# Patient Record
Sex: MALE | Race: BLACK OR AFRICAN AMERICAN | NOT HISPANIC OR LATINO | Employment: UNEMPLOYED | URBAN - METROPOLITAN AREA
[De-identification: names, ages, dates, MRNs, and addresses within clinical notes are randomized per-mention and may not be internally consistent; named-entity substitution may affect disease eponyms.]

---

## 2021-11-09 ENCOUNTER — TELEPHONE (OUTPATIENT)
Dept: BEHAVIORAL HEALTH | Facility: CLINIC | Age: 41
End: 2021-11-09

## 2021-11-09 ENCOUNTER — HOSPITAL ENCOUNTER (INPATIENT)
Facility: CLINIC | Age: 41
LOS: 3 days | Discharge: HOME | DRG: 897 | End: 2021-11-12
Attending: EMERGENCY MEDICINE | Admitting: PSYCHIATRY & NEUROLOGY

## 2021-11-09 DIAGNOSIS — Z20.822 LAB TEST NEGATIVE FOR COVID-19 VIRUS: ICD-10-CM

## 2021-11-09 DIAGNOSIS — R45.851 SUICIDAL IDEATION: ICD-10-CM

## 2021-11-09 DIAGNOSIS — I10 HYPERTENSION, UNSPECIFIED TYPE: Primary | ICD-10-CM

## 2021-11-09 DIAGNOSIS — T14.8XXA OPEN WOUND: ICD-10-CM

## 2021-11-09 DIAGNOSIS — F43.23 ADJUSTMENT DISORDER WITH MIXED ANXIETY AND DEPRESSED MOOD: ICD-10-CM

## 2021-11-09 LAB
ALBUMIN SERPL-MCNC: 2.8 G/DL (ref 3.4–5)
ALCOHOL BREATH TEST: 0 (ref 0–0.01)
ALP SERPL-CCNC: 85 U/L (ref 40–150)
ALT SERPL W P-5'-P-CCNC: 23 U/L (ref 0–70)
AMPHETAMINES UR QL SCN: ABNORMAL
ANION GAP SERPL CALCULATED.3IONS-SCNC: 6 MMOL/L (ref 3–14)
ANION GAP SERPL CALCULATED.3IONS-SCNC: 6 MMOL/L (ref 3–14)
APAP SERPL-MCNC: <2 MG/L (ref 10–30)
AST SERPL W P-5'-P-CCNC: 28 U/L (ref 0–45)
ATRIAL RATE - MUSE: 98 BPM
BARBITURATES UR QL: ABNORMAL
BASOPHILS # BLD AUTO: 0.1 10E3/UL (ref 0–0.2)
BASOPHILS NFR BLD AUTO: 1 %
BENZODIAZ UR QL: ABNORMAL
BILIRUB SERPL-MCNC: 0.4 MG/DL (ref 0.2–1.3)
BUN SERPL-MCNC: 35 MG/DL (ref 7–30)
BUN SERPL-MCNC: 36 MG/DL (ref 7–30)
CALCIUM SERPL-MCNC: 8.3 MG/DL (ref 8.5–10.1)
CALCIUM SERPL-MCNC: 8.4 MG/DL (ref 8.5–10.1)
CANNABINOIDS UR QL SCN: ABNORMAL
CHLORIDE BLD-SCNC: 110 MMOL/L (ref 94–109)
CHLORIDE BLD-SCNC: 110 MMOL/L (ref 94–109)
CO2 SERPL-SCNC: 22 MMOL/L (ref 20–32)
CO2 SERPL-SCNC: 22 MMOL/L (ref 20–32)
COCAINE UR QL: ABNORMAL
CREAT SERPL-MCNC: 2.04 MG/DL (ref 0.66–1.25)
CREAT SERPL-MCNC: 2.14 MG/DL (ref 0.66–1.25)
DIASTOLIC BLOOD PRESSURE - MUSE: NORMAL MMHG
EOSINOPHIL # BLD AUTO: 0 10E3/UL (ref 0–0.7)
EOSINOPHIL NFR BLD AUTO: 0 %
ERYTHROCYTE [DISTWIDTH] IN BLOOD BY AUTOMATED COUNT: 14.6 % (ref 10–15)
GFR SERPL CREATININE-BSD FRML MDRD: 38 ML/MIN/1.73M2
GFR SERPL CREATININE-BSD FRML MDRD: 40 ML/MIN/1.73M2
GLUCOSE BLD-MCNC: 72 MG/DL (ref 70–99)
GLUCOSE BLD-MCNC: 73 MG/DL (ref 70–99)
HCT VFR BLD AUTO: 35.2 % (ref 40–53)
HGB BLD-MCNC: 11.5 G/DL (ref 13.3–17.7)
IMM GRANULOCYTES # BLD: 0 10E3/UL
IMM GRANULOCYTES NFR BLD: 0 %
INTERPRETATION ECG - MUSE: NORMAL
LYMPHOCYTES # BLD AUTO: 1.4 10E3/UL (ref 0.8–5.3)
LYMPHOCYTES NFR BLD AUTO: 16 %
MCH RBC QN AUTO: 26.5 PG (ref 26.5–33)
MCHC RBC AUTO-ENTMCNC: 32.7 G/DL (ref 31.5–36.5)
MCV RBC AUTO: 81 FL (ref 78–100)
MONOCYTES # BLD AUTO: 0.4 10E3/UL (ref 0–1.3)
MONOCYTES NFR BLD AUTO: 4 %
NEUTROPHILS # BLD AUTO: 7 10E3/UL (ref 1.6–8.3)
NEUTROPHILS NFR BLD AUTO: 79 %
NRBC # BLD AUTO: 0 10E3/UL
NRBC BLD AUTO-RTO: 0 /100
OPIATES UR QL SCN: ABNORMAL
P AXIS - MUSE: 72 DEGREES
PLATELET # BLD AUTO: 398 10E3/UL (ref 150–450)
POTASSIUM BLD-SCNC: 4.6 MMOL/L (ref 3.4–5.3)
POTASSIUM BLD-SCNC: 5 MMOL/L (ref 3.4–5.3)
PR INTERVAL - MUSE: 154 MS
PROT SERPL-MCNC: 7.7 G/DL (ref 6.8–8.8)
QRS DURATION - MUSE: 76 MS
QT - MUSE: 398 MS
QTC - MUSE: 508 MS
R AXIS - MUSE: -19 DEGREES
RBC # BLD AUTO: 4.34 10E6/UL (ref 4.4–5.9)
SALICYLATES SERPL-MCNC: <2 MG/DL
SARS-COV-2 RNA RESP QL NAA+PROBE: NEGATIVE
SODIUM SERPL-SCNC: 138 MMOL/L (ref 133–144)
SODIUM SERPL-SCNC: 138 MMOL/L (ref 133–144)
SYSTOLIC BLOOD PRESSURE - MUSE: NORMAL MMHG
T AXIS - MUSE: 87 DEGREES
TROPONIN I SERPL-MCNC: 0.04 UG/L (ref 0–0.04)
TROPONIN I SERPL-MCNC: 0.04 UG/L (ref 0–0.04)
VENTRICULAR RATE- MUSE: 98 BPM
WBC # BLD AUTO: 8.8 10E3/UL (ref 4–11)

## 2021-11-09 PROCEDURE — 86780 TREPONEMA PALLIDUM: CPT | Performed by: EMERGENCY MEDICINE

## 2021-11-09 PROCEDURE — 90791 PSYCH DIAGNOSTIC EVALUATION: CPT

## 2021-11-09 PROCEDURE — 93010 ELECTROCARDIOGRAM REPORT: CPT | Performed by: EMERGENCY MEDICINE

## 2021-11-09 PROCEDURE — 85025 COMPLETE CBC W/AUTO DIFF WBC: CPT | Performed by: EMERGENCY MEDICINE

## 2021-11-09 PROCEDURE — 99285 EMERGENCY DEPT VISIT HI MDM: CPT | Mod: 25 | Performed by: EMERGENCY MEDICINE

## 2021-11-09 PROCEDURE — 84484 ASSAY OF TROPONIN QUANT: CPT | Performed by: EMERGENCY MEDICINE

## 2021-11-09 PROCEDURE — 93005 ELECTROCARDIOGRAM TRACING: CPT | Performed by: EMERGENCY MEDICINE

## 2021-11-09 PROCEDURE — 124N000002 HC R&B MH UMMC

## 2021-11-09 PROCEDURE — 87389 HIV-1 AG W/HIV-1&-2 AB AG IA: CPT | Performed by: EMERGENCY MEDICINE

## 2021-11-09 PROCEDURE — C9803 HOPD COVID-19 SPEC COLLECT: HCPCS | Performed by: EMERGENCY MEDICINE

## 2021-11-09 PROCEDURE — 82075 ASSAY OF BREATH ETHANOL: CPT | Performed by: EMERGENCY MEDICINE

## 2021-11-09 PROCEDURE — U0005 INFEC AGEN DETEC AMPLI PROBE: HCPCS | Performed by: EMERGENCY MEDICINE

## 2021-11-09 PROCEDURE — 80048 BASIC METABOLIC PNL TOTAL CA: CPT | Performed by: EMERGENCY MEDICINE

## 2021-11-09 PROCEDURE — 80143 DRUG ASSAY ACETAMINOPHEN: CPT | Performed by: EMERGENCY MEDICINE

## 2021-11-09 PROCEDURE — 80179 DRUG ASSAY SALICYLATE: CPT | Performed by: EMERGENCY MEDICINE

## 2021-11-09 PROCEDURE — 80307 DRUG TEST PRSMV CHEM ANLYZR: CPT | Performed by: EMERGENCY MEDICINE

## 2021-11-09 PROCEDURE — 250N000013 HC RX MED GY IP 250 OP 250 PS 637: Performed by: PSYCHIATRY & NEUROLOGY

## 2021-11-09 PROCEDURE — 36415 COLL VENOUS BLD VENIPUNCTURE: CPT | Performed by: EMERGENCY MEDICINE

## 2021-11-09 RX ORDER — AMOXICILLIN 250 MG
1 CAPSULE ORAL 2 TIMES DAILY PRN
Status: DISCONTINUED | OUTPATIENT
Start: 2021-11-09 | End: 2021-11-12 | Stop reason: HOSPADM

## 2021-11-09 RX ORDER — ACETAMINOPHEN 325 MG/1
650 TABLET ORAL EVERY 4 HOURS PRN
Status: DISCONTINUED | OUTPATIENT
Start: 2021-11-09 | End: 2021-11-12 | Stop reason: HOSPADM

## 2021-11-09 RX ORDER — OLANZAPINE 10 MG/2ML
10 INJECTION, POWDER, FOR SOLUTION INTRAMUSCULAR 3 TIMES DAILY PRN
Status: DISCONTINUED | OUTPATIENT
Start: 2021-11-09 | End: 2021-11-10

## 2021-11-09 RX ORDER — OLANZAPINE 10 MG/1
10 TABLET ORAL 3 TIMES DAILY PRN
Status: DISCONTINUED | OUTPATIENT
Start: 2021-11-09 | End: 2021-11-10

## 2021-11-09 RX ORDER — TRAZODONE HYDROCHLORIDE 50 MG/1
50 TABLET, FILM COATED ORAL
Status: DISCONTINUED | OUTPATIENT
Start: 2021-11-09 | End: 2021-11-12 | Stop reason: HOSPADM

## 2021-11-09 RX ORDER — HYDROXYZINE HYDROCHLORIDE 25 MG/1
25 TABLET, FILM COATED ORAL EVERY 4 HOURS PRN
Status: DISCONTINUED | OUTPATIENT
Start: 2021-11-09 | End: 2021-11-12 | Stop reason: HOSPADM

## 2021-11-09 RX ORDER — MAGNESIUM HYDROXIDE/ALUMINUM HYDROXICE/SIMETHICONE 120; 1200; 1200 MG/30ML; MG/30ML; MG/30ML
30 SUSPENSION ORAL EVERY 4 HOURS PRN
Status: DISCONTINUED | OUTPATIENT
Start: 2021-11-09 | End: 2021-11-12 | Stop reason: HOSPADM

## 2021-11-09 RX ADMIN — ACETAMINOPHEN 650 MG: 325 TABLET, FILM COATED ORAL at 19:49

## 2021-11-09 ASSESSMENT — ACTIVITIES OF DAILY LIVING (ADL)
HYGIENE/GROOMING: INDEPENDENT
DIFFICULTY_COMMUNICATING: NO
TOILETING_ISSUES: NO
DIFFICULTY_EATING/SWALLOWING: NO
DRESS: SCRUBS (BEHAVIORAL HEALTH);INDEPENDENT
DRESSING/BATHING_DIFFICULTY: NO
ORAL_HYGIENE: INDEPENDENT

## 2021-11-09 ASSESSMENT — MIFFLIN-ST. JEOR
SCORE: 1614.8
SCORE: 1615.25

## 2021-11-09 ASSESSMENT — ENCOUNTER SYMPTOMS
COUGH: 0
DYSPHORIC MOOD: 1
HEADACHES: 1
SHORTNESS OF BREATH: 1

## 2021-11-09 NOTE — ED NOTES
Appears to with admission. Requested and given scrub pants and top. Clothes and shoes- 1 bag total given to transport

## 2021-11-09 NOTE — ED PROVIDER NOTES
"    [facility] EMERGENCY DEPARTMENT ([facility])    11/09/21     ED 9 12:02 PM  History     Chief Complaint   Patient presents with     Ingestion     Pt smoked THC laced with unknown substance     The history is provided by the patient.     Olvin Jaimes is a 41 year old male who presents with suicidal ideation and concern that his marijuana was laced.  He is new to the [facility] system.  He reports using 2 g of cocaine this morning in an attempt to overdose and kill himself.  He did get some chest pain or shortness of breath with this, states that he \"Just wanted to go out peacefully.\"  He also smoked some marijuana that he thinks was  laced with some unknown substance as it was not affecting him the way that marijuana usually does.  He thinks this may have been K2.  He states that states his eyesight went in and out, and he was nauseated.  He states this was not an attempt to kill himself but to just feel better.  He currently c/o nausea and persistent SI. He wants to overdose or take pills but does not think he will harm himself while in the hospital here. He has been picking off scabs repeatedly on his knees.  He states that he had been picking at his scabs just to feel something.  He states that he has been vaccinated for COVID-19. Last tetanus booster was a couple years ago. Patient has no past medical records in our system.     Past Medical History  History reviewed. No pertinent past medical history.  History reviewed. No pertinent surgical history.  No current outpatient medications on file.    Allergies   Allergen Reactions     Ibuprofen GI Disturbance     vomiting     Family History  History reviewed. No pertinent family history.  Social History   Social History     Tobacco Use     Smoking status: Never Smoker     Smokeless tobacco: Never Used   Substance Use Topics     Alcohol use: Yes     Comment: daily for the past 2 weeks     Drug use: Yes     Types: Marijuana, Cocaine      Past " "medical history, past surgical history, medications, allergies, family history, and social history were reviewed with the patient. No additional pertinent items.       Review of Systems   Eyes: Positive for visual disturbance (Blurry vision).   Respiratory: Positive for shortness of breath. Negative for cough.    Cardiovascular: Positive for chest pain.   Neurological: Positive for headaches.   Psychiatric/Behavioral: Positive for dysphoric mood, self-injury and suicidal ideas.   All other systems reviewed and are negative.    A complete review of systems was performed with pertinent positives and negatives noted in the HPI, and all other systems negative.    Physical Exam   BP: (!) 161/123  Pulse: 100  Temp: 97.7  F (36.5  C)  Resp: 16  Height: 177.8 cm (5' 10\")  Weight: 69.4 kg (153 lb)  SpO2: 100 %  Physical Exam  Vitals and nursing note reviewed.   Constitutional:       General: He is not in acute distress.     Appearance: He is well-developed. He is not diaphoretic.   HENT:      Head: Normocephalic and atraumatic.      Nose: Nose normal.   Eyes:      General: No scleral icterus.     Conjunctiva/sclera: Conjunctivae normal.   Cardiovascular:      Rate and Rhythm: Regular rhythm. Tachycardia present.   Pulmonary:      Effort: Pulmonary effort is normal. No respiratory distress.      Breath sounds: No stridor.   Abdominal:      General: There is no distension.      Palpations: Abdomen is soft.      Tenderness: There is no abdominal tenderness. There is no guarding.   Musculoskeletal:         General: No deformity or signs of injury. Normal range of motion.      Cervical back: Normal range of motion and neck supple. No rigidity.   Skin:     General: Skin is warm and dry.      Coloration: Skin is not jaundiced or pale.      Findings: No rash.             Comments: Chronic wounds on anterior knees from picking at scabs. No redness or drainage. No bleeding. Few  crusted ulcerations on face.   Neurological:      " General: No focal deficit present.      Mental Status: He is alert and oriented to person, place, and time.   Psychiatric:         Attention and Perception: Attention normal.         Mood and Affect: Mood is anxious and depressed.         Speech: Speech normal.         Behavior: Behavior is agitated. Behavior is cooperative.         Thought Content: Thought content includes suicidal ideation. Thought content includes suicidal plan.         Judgment: Judgment is impulsive.         ED Course      Procedures            EKG Interpretation:      Interpreted by Lisa Mata MD  Time reviewed: 859  Symptoms at time of EKG: CP   Rhythm: normal sinus   Rate: Normal  Axis: Left Axis Deviation  Ectopy: none  Conduction: prolonged QTc  ST Segments/ T Waves: Non-specific ST-T wave changes  Q Waves: nonspecific  Comparison to prior: No old EKG available    Clinical Impression: non-specific EKG                   Results for orders placed or performed during the hospital encounter of 11/09/21   Troponin I     Status: Normal   Result Value Ref Range    Troponin I 0.039 0.000 - 0.045 ug/L   Basic metabolic panel     Status: Abnormal   Result Value Ref Range    Sodium 138 133 - 144 mmol/L    Potassium 4.6 3.4 - 5.3 mmol/L    Chloride 110 (H) 94 - 109 mmol/L    Carbon Dioxide (CO2) 22 20 - 32 mmol/L    Anion Gap 6 3 - 14 mmol/L    Urea Nitrogen 35 (H) 7 - 30 mg/dL    Creatinine 2.14 (H) 0.66 - 1.25 mg/dL    Calcium 8.4 (L) 8.5 - 10.1 mg/dL    Glucose 73 70 - 99 mg/dL    GFR Estimate 38 (L) >60 mL/min/1.73m2   Comprehensive metabolic panel     Status: Abnormal   Result Value Ref Range    Sodium 138 133 - 144 mmol/L    Potassium 5.0 3.4 - 5.3 mmol/L    Chloride 110 (H) 94 - 109 mmol/L    Carbon Dioxide (CO2) 22 20 - 32 mmol/L    Anion Gap 6 3 - 14 mmol/L    Urea Nitrogen 36 (H) 7 - 30 mg/dL    Creatinine 2.04 (H) 0.66 - 1.25 mg/dL    Calcium 8.3 (L) 8.5 - 10.1 mg/dL    Glucose 72 70 - 99 mg/dL    Alkaline Phosphatase 85 40 - 150 U/L     AST 28 0 - 45 U/L    ALT 23 0 - 70 U/L    Protein Total 7.7 6.8 - 8.8 g/dL    Albumin 2.8 (L) 3.4 - 5.0 g/dL    Bilirubin Total 0.4 0.2 - 1.3 mg/dL    GFR Estimate 40 (L) >60 mL/min/1.73m2   Asymptomatic COVID-19 Virus (Coronavirus) by PCR Nasopharyngeal     Status: Normal    Specimen: Nasopharyngeal; Swab   Result Value Ref Range    SARS CoV2 PCR Negative Negative    Narrative    Testing was performed using the Xpert Xpress SARS-CoV-2 Assay on the  Cepheid Gene-Xpert Instrument Systems. Additional information about  this Emergency Use Authorization (EUA) assay can be found via the Lab  Guide. This test should be ordered for the detection of SARS-CoV-2 in  individuals who meet SARS-CoV-2 clinical and/or epidemiological  criteria. Test performance is unknown in asymptomatic patients. This  test is for in vitro diagnostic use under the FDA EUA for  laboratories certified under CLIA to perform high complexity testing.  This test has not been FDA cleared or approved. A negative result  does not rule out the presence of PCR inhibitors in the specimen or  target RNA in concentration below the limit of detection for the  assay. The possibility of a false negative should be considered if  the patient's recent exposure or clinical presentation suggests  COVID-19. This test was validated by the [facility] Infectious  Dise[facility] Diagnostic [facility]. This laboratory is certified under  the Clinical Laboratory Improvement Amendments of 1988 (CLIA-88) as  qualified to perform high complexity laboratory testing.     Drug abuse screen 1 urine (ED)     Status: Abnormal   Result Value Ref Range    Amphetamines Urine Screen Negative Screen Negative    Barbiturates Urine Screen Negative Screen Negative    Benzodiazepines Urine Screen Negative Screen Negative    Cannabinoids Urine Screen Negative Screen Negative    Cocaine Urine Screen Positive (A) Screen Negative    Opiates Urine Screen Negative Screen Negative   CBC with  platelets and differential     Status: Abnormal   Result Value Ref Range    WBC Count 8.8 4.0 - 11.0 10e3/uL    RBC Count 4.34 (L) 4.40 - 5.90 10e6/uL    Hemoglobin 11.5 (L) 13.3 - 17.7 g/dL    Hematocrit 35.2 (L) 40.0 - 53.0 %    MCV 81 78 - 100 fL    MCH 26.5 26.5 - 33.0 pg    MCHC 32.7 31.5 - 36.5 g/dL    RDW 14.6 10.0 - 15.0 %    Platelet Count 398 150 - 450 10e3/uL    % Neutrophils 79 %    % Lymphocytes 16 %    % Monocytes 4 %    % Eosinophils 0 %    % Basophils 1 %    % Immature Granulocytes 0 %    NRBCs per 100 WBC 0 <1 /100    Absolute Neutrophils 7.0 1.6 - 8.3 10e3/uL    Absolute Lymphocytes 1.4 0.8 - 5.3 10e3/uL    Absolute Monocytes 0.4 0.0 - 1.3 10e3/uL    Absolute Eosinophils 0.0 0.0 - 0.7 10e3/uL    Absolute Basophils 0.1 0.0 - 0.2 10e3/uL    Absolute Immature Granulocytes 0.0 <=0.0 10e3/uL    Absolute NRBCs 0.0 10e3/uL   Troponin I     Status: Normal   Result Value Ref Range    Troponin I 0.041 0.000 - 0.045 ug/L   Acetaminophen level     Status: Abnormal   Result Value Ref Range    Acetaminophen <2 (L) 10 - 30 mg/L   Salicylate level     Status: Normal   Result Value Ref Range    Salicylate <2 <20 mg/dL   EKG 12 lead     Status: None   Result Value Ref Range    Systolic Blood Pressure  mmHg    Diastolic Blood Pressure  mmHg    Ventricular Rate 98 BPM    Atrial Rate 98 BPM    HI Interval 154 ms    QRS Duration 76 ms     ms    QTc 508 ms    P Axis 72 degrees    R AXIS -19 degrees    T Axis 87 degrees    Interpretation ECG       Sinus rhythm  T wave abnormality, consider anterior ischemia  Prolonged QT  Abnormal ECG  Unconfirmed report - interpretation of this ECG is computer generated - see medical record for final interpretation  Confirmed by - EMERGENCY ROOM, PHYSICIAN ([id number]),  JAKE SANCHEZ ([id number]) on 11/9/2021 10:18:54 AM     Alcohol breath test POCT     Status: Normal   Result Value Ref Range    Alcohol Breath Test 0.000 0.00 - 0.01   CBC with platelets differential     Status:  Abnormal    Narrative    The following orders were created for panel order CBC with platelets differential.  Procedure                               Abnormality         Status                     ---------                               -----------         ------                     CBC with platelets and d...[[id number]]  Abnormal            Final result                 Please view results for these tests on the individual orders.   Urine Drugs of Abuse Screen     Status: Abnormal    Narrative    The following orders were created for panel order Urine Drugs of Abuse Screen.  Procedure                               Abnormality         Status                     ---------                               -----------         ------                     Drug abuse screen 1 urin...[[id number]]  Abnormal            Final result                 Please view results for these tests on the individual orders.     Medications - No data to display     Assessments & Plan (with Medical Decision Making)   Olvin Jaimes is a 41 year old male who presents with suicidal ideation and attempt via overdose.      Ddx: cocaine chest pain, drug overdose, SI, ACUTE CORONARY SYNDROME, drug exposure NOS    Patient tachycardic and htn on arrival. Afebrile with sats 100% on room air in no respiratory distress.  Reports doing cocaine.  EKG without acute ischemic changes.  Troponin 0.039.  Patient has normal chest pain on arrival.  Its that he had some chest pain and shortness of breath after the use of cocaine this morning but this has since mostly resolved.  He reports having persistent suicidal ideation with a plan to overdose on pills.  Thinks he can keep himself safe while in the emergency department.  Hemoglobin was 11.5.  White count normal.  Breath alcohol 0.  Covid negative.  U tox positive for cocaine.  CMP with creatinine of 2.14.  No previous for comparison.  LFTs normal.  Sodium and potassium normal.  Acetaminophen and salicylate normal.  DEC   spoke with the patient about his suicide ideation.  They recommend patient be admitted to psychiatry for SI.  Patient reported concern for HIV exposure when he spoke with the .  I spoke with the patient about this and he states that he has been having sex with a female who has AIDS.  His last sexual exposure was a week ago.  Since this is over the 72-hour time period, postexposure prophylaxis is no longer indicated.  However it is appropriate to test him.  HIV antigen antibody combo sent as well as syphilis, per his request.  He does not want gonorrhea or chlamydia testing.  A repeat troponin was 0.041.  He does not have active chest pain so I think he can be ruled out for ACS.  He remains mildly hypertensive which is likely chronic untreated hypertension.  It is unclear what he was exposed to in the marijuana he smoked prior to arrival.  However he denies active symptoms.  Clarified with the patient that the marijuana was given to him by someone new. The patient does report telling this person that he wanted to die. However, this person did not specifically tell the patient that it was something that could kill him or was potentially lethal.  Therefore, I have a low suspicion the potential exposure was something life-threatening.  Patient has no syndrome of opiate or other drug toxicity.  I think he can be medically cleared for psychiatric admission.  Pt is voluntary for admission.    I have reviewed the nursing notes. I have reviewed the findings, diagnosis, plan and need for follow up with the patient.    New Prescriptions    No medications on file       Final diagnoses:   None     Mikaela MAHMOOD, am serving as a trained medical scribe to document services personally performed by Lisa Mata MD based on the provider's statements to me on November 9, 2021.  This document has been checked and approved by the attending provider.    I, Lisa Mata MD, was physically present and have  reviewed and verified the accuracy of this note documented by Mikaela Chavez, medical scribe.      Lisa Mata MD     [facility] EMERGENCY DEPARTMENT  11/9/2021     Lisa Mata MD  11/09/21 [id number]

## 2021-11-09 NOTE — ED NOTES
"ED to Behavioral Floor Handoff    SITUATION  Olvin Jaimes is a 41 year old male who speaks Data Unavailable and lives in a home unknown The patient arrived in the ED by private car from home with a complaint of Ingestion (Pt smoked THC laced with unknown substance)  .The patient's current symptoms started/worsened 1 week(s) ago and during this time the symptoms have increased.   In the ED, pt was diagnosed with   Final diagnoses:   None        Initial vitals were: BP: (!) 161/123  Pulse: 100  Temp: 97.7  F (36.5  C)  Resp: 16  Height: 177.8 cm (5' 10\")  Weight: 69.4 kg (153 lb)  SpO2: 100 %   --------  Is the patient diabetic? No   If yes, last blood glucose? --     If yes, was this treated in the ED? --  --------  Is the patient inebriated (ETOH) No or Impaired on other substances? No  MSSA done? N/A  Last MSSA score: --    Were withdrawal symptoms treated? N/A  Does the patient have a seizure history? No. If yes, date of most recent seizure--  --------  Is the patient patient experiencing suicidal ideation? reports the following suicide factors: Wants to go peacefully, attemping to OD.    Homicidal ideation? denies current or recent homicidal ideation or behaviors.    Self-injurious behavior/urges? denies current or recent self injurious behavior or ideation.  ------  Was pt aggressive in the ED No  Was a code called No  Is the pt now cooperative? No  -------  Meds given in ED: Medications - No data to display   Family present during ED course? No  Family currently present? No    BACKGROUND  Does the patient have a cognitive impairment or developmental disability? No  Allergies:   Allergies   Allergen Reactions     Ibuprofen GI Disturbance     vomiting   .   Social demographics are   Social History     Socioeconomic History     Marital status: None     Spouse name: None     Number of children: None     Years of education: None     Highest education level: None   Occupational History     None   Tobacco Use     " Smoking status: Never Smoker     Smokeless tobacco: Never Used   Substance and Sexual Activity     Alcohol use: Yes     Comment: daily for the past 2 weeks     Drug use: Yes     Types: Marijuana, Cocaine     Sexual activity: None   Other Topics Concern     None   Social History Narrative     None     Social Determinants of Health     Financial Resource Strain: Not on file   Food Insecurity: Not on file   Transportation Needs: Not on file   Physical Activity: Not on file   Stress: Not on file   Social Connections: Not on file   Intimate Partner Violence: Not on file   Housing Stability: Not on file        ASSESSMENT  Labs results   Labs Ordered and Resulted from Time of ED Arrival to Time of ED Departure   BASIC METABOLIC PANEL - Abnormal       Result Value    Sodium 138      Potassium 4.6      Chloride 110 (*)     Carbon Dioxide (CO2) 22      Anion Gap 6      Urea Nitrogen 35 (*)     Creatinine 2.14 (*)     Calcium 8.4 (*)     Glucose 73      GFR Estimate 38 (*)    COMPREHENSIVE METABOLIC PANEL - Abnormal    Sodium 138      Potassium 5.0      Chloride 110 (*)     Carbon Dioxide (CO2) 22      Anion Gap 6      Urea Nitrogen 36 (*)     Creatinine 2.04 (*)     Calcium 8.3 (*)     Glucose 72      Alkaline Phosphatase 85      AST 28      ALT 23      Protein Total 7.7      Albumin 2.8 (*)     Bilirubin Total 0.4      GFR Estimate 40 (*)    DRUG ABUSE SCREEN 1 URINE (ED) - Abnormal    Amphetamines Urine Screen Negative      Barbiturates Urine Screen Negative      Benzodiazepines Urine Screen Negative      Cannabinoids Urine Screen Negative      Cocaine Urine Screen Positive (*)     Opiates Urine Screen Negative     CBC WITH PLATELETS AND DIFFERENTIAL - Abnormal    WBC Count 8.8      RBC Count 4.34 (*)     Hemoglobin 11.5 (*)     Hematocrit 35.2 (*)     MCV 81      MCH 26.5      MCHC 32.7      RDW 14.6      Platelet Count 398      % Neutrophils 79      % Lymphocytes 16      % Monocytes 4      % Eosinophils 0      % Basophils  "1      % Immature Granulocytes 0      NRBCs per 100 WBC 0      Absolute Neutrophils 7.0      Absolute Lymphocytes 1.4      Absolute Monocytes 0.4      Absolute Eosinophils 0.0      Absolute Basophils 0.1      Absolute Immature Granulocytes 0.0      Absolute NRBCs 0.0     ACETAMINOPHEN LEVEL - Abnormal    Acetaminophen <2 (*)    TROPONIN I - Normal    Troponin I 0.039     TROPONIN I - Normal    Troponin I 0.041     SALICYLATE LEVEL - Normal    Salicylate <2     ALCOHOL BREATH TEST POCT - Normal    Alcohol Breath Test 0.000     COVID-19 VIRUS (CORONAVIRUS) BY PCR   HIV ANTIGEN ANTIBODY COMBO   TREPONEMA PALLIDUM ANTIBODY CONFIRM      Imaging Studies: No results found for this or any previous visit (from the past 24 hour(s)).   Most recent vital signs BP (!) 141/107   Pulse 99   Temp 97.7  F (36.5  C) (Oral)   Resp 18   Ht 1.778 m (5' 10\")   Wt 69.4 kg (153 lb)   SpO2 95%   BMI 21.95 kg/m     Abnormal labs/tests/findings requiring intervention:---   Pain control: good  Nausea control: good    RECOMMENDATION  Are any infection precautions needed (MRSA, VRE, etc.)? No If yes, what infection? --  ---  Does the patient have mobility issues? independently. If yes, what device does the pt use? ---  ---  Is patient on 72 hour hold or commitment? No If on 72 hour hold, have hold and rights been given to patient? N/A  Are admitting orders written if after 10 p.m. ?N/A  Tasks needing to be completed:---     Shan Genao RN   [facility]--     [phone] [place] ED   [phone] [facility] ED    "

## 2021-11-09 NOTE — ED NOTES
Informed MD of clients blood pressures. MD wants to continue to monitor client. Will continue to monitor client. No new orders at this time.

## 2021-11-09 NOTE — PLAN OF CARE
Problem: Adult Inpatient Plan of Care  Goal: Optimal Comfort and Wellbeing  Outcome: No Change    Olvin is a 41 year-old male who is admitted from our ED on a voluntary basis for worsening depression and suicidal ideation. Per chart review,  Pt present to Ed by his bicycling self here and reporting he ingested and unknown substance as a suicide attempt. Pt further reports he has been trying to rose pills for a few days for a suicide attempt.  Per ED, pt endorsed smoking THC laced with unknown substance.  Pt has no Dx, no out pt providers, and no hx of hospitalization. Pt did report his brother and an aunt struggle with Mental health. Pt endorses low mood, suicidal thoughts, suicidal plan, intent, worrying thoughts, helpless, hopeless, racing thoughts, panic attacks. Pt reports seeing shadows and has for a long time, but never told anyone.      Upon admission interview, pt refuses to participate in admit interview. Was being followed by a focusing on a female peer on the unit. The female peer stated that she liked him. Pt was at times labile and irritable. Dr Mcelroy agreed to pt's transfer to station [address] in order to keep him away from this particular female peer.   Plan: Status 15s; Build trust with pt. Continue to build on strengths. Encourage healthy coping.     Admission Notification: No one

## 2021-11-09 NOTE — ED NOTES
11/9/2021  Olvin Jaimes 1980     [facility] Crisis Assessment:    Started at: 11:05am  Completed at: 11:27am  Patient was assessed via virtually ([employer] cart or other teleconferencing device).  Patient Location: [facility] ED    Chief Complaint and History of Presenting Problem:    Patient is a 41 year old -American male who presented to the ED by Self related to concerns for intentional ingestion of unknown substance with the hopes for result to be death. Patient indicates been attempting to gain access to pills to overdose on for approximately a month. Patient reports loss of hope and feeling helpless. Patient has been engaging in risky behaviors  (substance use unprotected sex with an individual who is AIDS dx)and gave up his spiritual [employer] beliefs a few months ago. Patient reports that trigger was COVID. Indicates used to have employment.     Assessment and intervention involved meeting with pt, obtaining collateral from [facility] and Care Everywhere records and consulted with patient, employing crisis psychotherapy including: Establishing rapport, Active listening, Assess dimensions of crisis, Identify additional supports and alternative coping skills, Motivational Interviewing and Other: Provided a safe and esupportive environment, psychoeducation about therapy and benefits, provided non judgemental feedback.. Collateral information includes communication with patient, no records or EMR prior to today..     Biopsychosocial Background and Demographic Information    Patient is a Black man who presented to the ED by bicycle. He reports that he ingested pills today and began to feel horrible. Patient denies being homeless and indicates he lives with friends. Patient reported little about his past. He is irritable and not wanting to discuss too much at this time. Agitated and distrustful of others and process. Irritated as individuals walked in room during clinical interview.     Mental Health History  and Current Symptoms     Report of no prior mental health or substance hx until today. Indicates seeing shadows and states that has been going on for years, and he never informed anyone of symptoms. Endorses low mood, suicidal thoughts, plan, intent, low mood, worrying thoughts, helpless, hopeless, racing thoughts, panic attacks.          Mental Health History (prior psychiatric hospitalizations, civil commitments, programmatic care, etc):no prior hx  Family Mental and Chemical Health History: Reports 2 aunts and brother have mental health history.    Current and Historic Psychotropic Medications: n/a  Medication Adherent: n/a  Recent medication changes? n/a    Relevant Medical Concerns  Patient identifies concerns with completing ADLs? No  Patient can ambulate independently? Yes  Other medical health concerns? Has been engaging in sex with a person who has AIDS dx  History of concussion or TBI? No     Trauma History   Physical, Emotional, or Sexual abuse: No  Loss of a friend or family member to suicide: No  Other identified traumatic event or significant stressor: Yes    Substance Use History and Treatments  Reports recently began using cocaine and THC. States that he took an unknown substance in an attempt to die today, but is not sure what the substance was.     Drug screen/BAL/Breathalyzer Completed? Has not been collected or resulted as of yet  Results: not resulted     History of Suicidal Ideation, Suicide Attempts, Non-Suicidal Self Injury, and Risk Formulation:   Details of Current Ideation, Attempt(s), Plan(s): thoughts of suicide for a month, attempt today, plan is if released to attempt again by overdose.  Risk factors: unemployed, pandemic, loss of hope, helpless, first mental health interaction, hallucinations, substance use access to lethal means, feeling trapped and no reason for living/no sense of purpose.   Protective factors: in the ED stated willingness to try .  History and Prior Methods of  Self-injury: n/a  History of Suicide Attempts: states he has been looking for a month for pills to overdose.    ESS-6  1.a. Over the past 2 weeks, have you had thoughts of killing yourself? Yes   1.b. Have you ever attempted to kill yourself and, if yes, when did this last happen? Yes attempt today by ingestion of pills  2. Recent or current suicide plan? Yes  3. Recent or current intent to act on ideation? Yes  4. Lifetime psychiatric hospitalization? No  5. Pattern of excessive substance use? Yes  6. Current irritability, agitation, or aggression? Yes  ESS-6 Score: 5/6 High Risk      Other Risk Areas  Aggressive/assumptive/homicidal risk factors: No   Sexually inappropriate behavior? No      Vulnerability to sexual exploitation? No     Clinical Presentation and Current Symptoms   Patient presented to the ED by bicycle. Patient is irritable, anxious and depressed with affect that is congruent. Patient eye contact is variable. Speech is rushed and choppy, short and quick answers. He presents after suicide attempt. He denies homicidal. Responding to internal stimuli and states visual hallucinations has been present for many years. He does not appear with distorted cognition but admits to feeling hopeless and helpless. Patient is oriented to all spheres. Memory intact, Patient's judgment is impaired and insight poor.    Attention, Hyperactivity, and Impulsivity: No   Anxiety:Yes: Panic attacks and Generalized Symptoms: Agitation, Cognitive anxiety - feelings of doom, racing thoughts, difficulty concentrating  and Excessive worry    Behavioral Difficulties: Yes: Agitation, Anger Problems and Negativistic/Defiant   Mood Symptoms: Yes: Feelings of helplessness , Feelings of hopelessness , Feelings of worthlessness , Impaired decision making , Loss of interest / Anhedonia , Risky behaviors, Sad, depressed mood  and Thoughts of suicide/death    Appetite: No   Feeding and Eating: No  Interpersonal Functioning: No  Learning  Disabilities/Cognitive/Developmental Disorders: No   General Cognitive Impairments: No  If yes, see completed Mini-Cog Assessment below.  Sleep: No   Psychosis: Yes: Hallucinations: Visual    Trauma: No       Mental Status Exam:  Affect: Appropriate and Constricted  Appearance: Appropriate   Attention Span/Concentration: Attentive    Eye Contact: Variable  Fund of Knowledge: Appropriate   Language /Speech Content: Fluent  Language /Speech Volume: Normal   Language /Speech Rate/Productions: Hyperverbal and Normal   Recent Memory: Intact  Remote Memory: Intact  Mood: Anxious, Depressed and Irritable   Orientation:   Person: Yes   Place: Yes  Time of Day: Yes   Date: Yes   Situation (Do they understand why they are here?): Yes   Psychomotor Behavior: Agitated   Thought Content: Suicidal  Thought Form: Goal Directed and Intact    Current Providers and Contact Information   Patient is his own legal guardian.     Primary Care Provider: No  Psychiatrist: No  Therapist: No  : No  CTSS or ARMHS: No  ACT Team: No  Other: No    Has an TSELLA been signed? Yes ; For [place]; By: Olvin Jaimes; Relationship to patient self.     Clinical Summary and Recommendations    Clinical summary of assessment (include strengths, protective factors, community resources, and assessment of vulnerability/risk): Patient came to the ED after feeling discomfort after ingestion of unknown substance. Patient has limited protective factors and access to community resources. He is adamant about desire, capability and intent to die. He is negative, irritable, and snappy with response. He does apologize to this provider for being short. He is in need of inpatient evaluation as he attempted suicide today and will again. He describes having no hope and has been sexually active with an individual who has AIDS dx and participating in risky behavior along with gave up spiritual beliefs. In order to mitigate risk inpatient is needed. After discussion  of psychoeducation and aligning with client he agreed to go inpatient.      Diagnosis with F Codes:  F43.23     Disposition  Attending providers were not available for consultation. Continued for recommendation for inpatient and patient agrees with recommended level of care.   Details of final disposition include: Inpatient mental health . Psychiatric Evaluation and not released until reaches a safe psychological state along with community resources and outpatient therapeutic services.      If Inpatient, is patient admitted voluntary? Yes   Patient aware of potential for transfer if there is not appropriate placement? No  Patient is willing to travel outside of the [facility] for placement? No   Central Intake Notified? Yes: Date: 11/9/2021 Time: 11:27am.  Safety/after care plan provided to Facility Staff, HUC by [facility]    Duration of assessment time: .50 hrs    CPT code(s) utilized: 60669, up to 74 minutes      NACHO Lubin

## 2021-11-09 NOTE — PROGRESS NOTES
LOCKER:   Long-sleeve shirt, socks, jeans, Beanie hat, Jacket, ear buds, lighter X2, Phone, , keys, rings, small metal object X2, ID cards X3, shoes,     SENT TO SECURITY:  Pocket knife, butter knife, knife.         11/09/21 1712   Patient Belongings   Patient Belongings locker;sent to security per site process   Patient Belongings Put in Hospital Secure Location (Security or Locker, etc.) ring;shoes;keys;cell phone/electronics;clothing;other (see comments)   Belongings Search Yes     A               Admission:  I am responsible for any personal items that are not sent to the safe or pharmacy.  [facility] is not responsible for loss, theft or damage of any property in my possession.    Signature:  _________________________________ Date: _______  Time: _____                                              Staff Signature:  ____________________________ Date: ________  Time: _____      2nd Staff person, if patient is unable/unwilling to sign:    Signature: ________________________________ Date: ________  Time: _____     Discharge:  [facility] has returned all of my personal belongings:    Signature: _________________________________ Date: ________  Time: _____                                          Staff Signature:  ____________________________ Date: ________  Time: _____

## 2021-11-09 NOTE — TELEPHONE ENCOUNTER
S:  [place], DEC called @ 11:29am WITH 41Y/m who had a suicide attempt by overdose for IP.    B:  Pt present to ed by his bicycling self here and reporting he ingested and unknown substance as a suicide attempt.  Pt further reports he has been trying to horad pills for a few days for a suicide attempt.    Per ED, pt endorsed smoking THC laced with unknown substance.   Pt has no Dx, no out pt providers, and no hx of hospitalization.     Pt did report his brother and an aunt struggle with Mental health.     Pt endorses low mood, suicidal thoughts, suicidal plan, intent, low mood, worrying thoughts, helpless, hopeless, racing thoughts, panic attacks.   Pt reports seeing shadows and has for a long time, but never told anyone.     Pt further gave up on his [employer] a few months ago.  Pt feels COVID has played a bigger part in his stressors.   Pt has been engaging in high risk behaviors.         A:  Vol    R:  Patient cleared and ready for behavioral bed placement: Yes   UTOX positive for Cocaine    Provider paged @ 11:55am for 10/Allan Lemus called back @ 12:05pm and accepted pt for station 10N.    Pt placed in que and unit called with disposition @ 12:23pm.  Units current discharges not occurring until after 330pm.   [place] ED updated with placement @ 12:26pm

## 2021-11-09 NOTE — ED NOTES
"Asked client if he takes any blood pressure medication. Client states his BP is always high. Client states after being asked if 141/107 is normal for them, \"Yeah, it is always high, I should probablly take something, but I don't.\"  "

## 2021-11-10 LAB — HIV 1+2 AB+HIV1 P24 AG SERPL QL IA: NONREACTIVE

## 2021-11-10 PROCEDURE — 99223 1ST HOSP IP/OBS HIGH 75: CPT | Mod: AI | Performed by: NURSE PRACTITIONER

## 2021-11-10 PROCEDURE — 250N000013 HC RX MED GY IP 250 OP 250 PS 637: Performed by: NURSE PRACTITIONER

## 2021-11-10 PROCEDURE — 250N000009 HC RX 250: Performed by: NURSE PRACTITIONER

## 2021-11-10 PROCEDURE — 124N000002 HC R&B MH UMMC

## 2021-11-10 PROCEDURE — 250N000013 HC RX MED GY IP 250 OP 250 PS 637: Performed by: PSYCHIATRY & NEUROLOGY

## 2021-11-10 RX ORDER — GINSENG 100 MG
CAPSULE ORAL 2 TIMES DAILY
Status: DISCONTINUED | OUTPATIENT
Start: 2021-11-10 | End: 2021-11-12 | Stop reason: HOSPADM

## 2021-11-10 RX ORDER — OLANZAPINE 2.5 MG/1
5 TABLET, FILM COATED ORAL AT BEDTIME
Status: DISCONTINUED | OUTPATIENT
Start: 2021-11-10 | End: 2021-11-12 | Stop reason: HOSPADM

## 2021-11-10 RX ORDER — OLANZAPINE 2.5 MG/1
5-10 TABLET, FILM COATED ORAL 3 TIMES DAILY PRN
Status: DISCONTINUED | OUTPATIENT
Start: 2021-11-10 | End: 2021-11-12 | Stop reason: HOSPADM

## 2021-11-10 RX ORDER — OLANZAPINE 10 MG/2ML
10 INJECTION, POWDER, FOR SOLUTION INTRAMUSCULAR 3 TIMES DAILY PRN
Status: DISCONTINUED | OUTPATIENT
Start: 2021-11-10 | End: 2021-11-12 | Stop reason: HOSPADM

## 2021-11-10 RX ADMIN — ACETAMINOPHEN 650 MG: 325 TABLET, FILM COATED ORAL at 05:32

## 2021-11-10 RX ADMIN — OLANZAPINE 5 MG: 2.5 TABLET, FILM COATED ORAL at 10:07

## 2021-11-10 RX ADMIN — Medication 12.5 MG: at 14:50

## 2021-11-10 RX ADMIN — ACETAMINOPHEN 650 MG: 325 TABLET, FILM COATED ORAL at 21:37

## 2021-11-10 RX ADMIN — ALUMINUM HYDROXIDE, MAGNESIUM HYDROXIDE, AND SIMETHICONE 30 ML: 200; 200; 20 SUSPENSION ORAL at 21:38

## 2021-11-10 RX ADMIN — BACITRACIN ZINC: 500 OINTMENT TOPICAL at 12:51

## 2021-11-10 RX ADMIN — ACETAMINOPHEN 650 MG: 325 TABLET, FILM COATED ORAL at 10:07

## 2021-11-10 RX ADMIN — OLANZAPINE 5 MG: 2.5 TABLET, FILM COATED ORAL at 21:37

## 2021-11-10 RX ADMIN — HYDROXYZINE HYDROCHLORIDE 25 MG: 25 TABLET, FILM COATED ORAL at 05:33

## 2021-11-10 RX ADMIN — Medication 12.5 MG: at 08:48

## 2021-11-10 RX ADMIN — ACETAMINOPHEN 650 MG: 325 TABLET, FILM COATED ORAL at 14:50

## 2021-11-10 ASSESSMENT — ACTIVITIES OF DAILY LIVING (ADL)
ORAL_HYGIENE: INDEPENDENT
LAUNDRY: WITH SUPERVISION
HYGIENE/GROOMING: INDEPENDENT
DRESS: SCRUBS (BEHAVIORAL HEALTH);INDEPENDENT
HYGIENE/GROOMING: INDEPENDENT
LAUNDRY: WITH SUPERVISION
DRESS: SCRUBS (BEHAVIORAL HEALTH);INDEPENDENT
ORAL_HYGIENE: INDEPENDENT

## 2021-11-10 NOTE — PLAN OF CARE
"  Problem: Adult Inpatient Plan of Care  Goal: Optimal Comfort and Wellbeing  11/10/2021 0230 by Heydi Montoya, RN  Outcome: Improving  11/10/2021 0229 by Heydi Montoya, RN  Outcome: Improving      Patient slept 5.75 hours, woke up twice  for snacks and water, writer told patient that he has fasting labs in the  morning, patient was irritable and states \" I am refusing labs this morning, I am hungry and I need food\"  patient had some snacks and went back to his room. C/O pain, medicated with PRN tylenol. Patient also had hydroxyzine for anxiety, Will continue to monitor as needed.  "

## 2021-11-10 NOTE — PROVIDER NOTIFICATION
11/10/21 0813 11/10/21 1005   Vital Signs   Pulse 103 90   Pulse Rate Source  --  Monitor   BP (!) 176/126 (!) 163/125   BP Location  --  Right arm   Patient Position  --  Sitting     PRN hydralazine 12.5 mg administered at 0848 per order parameters. Pt denies chest pain or SOB. Patient is endorsing a headache 7/10 and anxiety 8/10 PRNs of tylenol (650mg) and zyprexa (5mg) administered.       11/10/21 1447   Vital Signs   Pulse 112   Pulse Rate Source Monitor   BP (!) 159/122   BP Location Left arm   Patient Position Sitting   Comments   Comments pt refused standing BP and more vitals     PRN hydralazine 12.5 mg administered at 1450 per order parameters.

## 2021-11-10 NOTE — H&P
History and Physical    Olvin Jaimes MRN# [id number]   Age: 41 year old YOB: 1980     Date of Admission:  11/9/2021            Diagnoses:     Unspecified psychosis, likely substance-induced  Unspecified depressive disorder, possibly substance-induced  Cannabis abuse  Stimulant (cocaine) abuse  Elevated blood pressure  Wounds on bilateral knees         Recommendations:     Admit to Unit: 32N    Attending Physician: Dr. Calloway, under the direct care of Haley Sheets NP    Patient is voluntary.    Routine lab studies have been requested.  HIV results pending.    Monitor for target symptoms.     Provide a safe environment and therapeutic milieu.    Consider WOC RN consult for bilateral knee wounds if he is willing to cooperate.  Ordered bacitracin BID.    Consider internal medicine consult if BP remains high and he is more willing to cooperate with treatment including physical exam.  PRN Hydralazine is available.     Medications:  Begin Zyprexa 5 mg at HS.  PRNs of Zyprexa, Trazodone and Hydroxyzine are available.    Disposition to be determined pending stabilization.  He reports having housing.  Will discuss the possibility of CD treatment.  He does not have outpatient providers.        Attestation:  Patient has been seen and evaluated by me, Sonia Sheets, APRN CNP  The patient was counseled on nature of illness and treatment plan/options  Care was coordinated with treatment team       Clinical Global Impressions  First:  Considering your total clinical experience with this particular patient population, how severe are the patient's symptoms at this time?: 7 (11/10/21 0851)  Compared to the patient's condition at the START of treatment, this patient's condition is: 4 (11/10/21 0851)  Most recent:  Considering your total clinical experience with this particular patient population, how severe are the patient's symptoms at this time?: 7 (11/10/21 0851)  Compared to the patient's condition at the  "START of treatment, this patient's condition is: 4 (11/10/21 0851)           Chief Complaint:     History is obtained from the patient and electronic health record.    Suicidal ideation         History of Present Illness:        Olvin Jaimes is a 41-year-old male admitted to [facility] on 11/9/2021.  He was admitted as a voluntary patient through the ER after using 2 g of cocaine and smoking cannabis in an attempt to commit suicide.  He believes the cannabis was laced with something and may have been K2 as it did not affect him as it usually does.  His vision was abnormal and he became nauseated.  UTOX was positive for cocaine.  He reported having sexual intercourse with a female who has HIV a week prior to admission and HIV testing was ordered with results pending.  He reported having given up his Jehovah's witness beliefs months ago.  In the ER he appeared to be responding to internal stimuli and reported that visual hallucinations of shadows have been present for years.  He was initially admitted to Station 10[facility] however a female patient was following him and expressed romantic interest, so he was transferred to N.  He was not taking any psychotropic medications prior to admission.  He was irritable, guarded and dismissive during the initial assessment.  When asked about stressors, he responded, \"life.\"  He reports that has been attempting to kill himself \"every day for weeks\" through use of substances.  When about his recent use of cocaine he responded, that he uses \"not that much.\"  When asked about his use of cannabis, he responded, \"not that much anymore.\"  He refused to allow provider to examine the wounds on his knees.  He said he does not like to take medications but is tentatively willing to do so.           Psychiatric Review of Systems:      He reports experiencing visual hallucinations of shadows for weeks or months, possibly years.  It was unclear whether these are only present when he is " "using substances.  He denies auditory hallucinations.  He denies paranoid/delusional thought content but was very guarded.  Mood is depressed, anxious and irritable.  He reports suicidal ideation with a plan to overdose.  He contracts for safety on the unit.  Appetite is good.  He has been sleeping only 1-2 hours per night.  Concentration is \"terrible.\"  Energy is low.  He endorses feelings of hopelessness.           Medical Review of Systems:     He has wounds on both knees.  A 10-point review of systems was completed and is otherwise negative with the exception of HPI.           Psychiatric History:     He has no prior history of psychiatric hospitalizations or treatment for mental illness.  He has never taken psychotropic medications.  With regard to suicide attempts, he states he has been attempting to kill himself \"every day for weeks\" through use of substances.            Substance Use History:     He reports using \"not that much\" cannabis and cocaine.  He denies any history of IV use.  He denies alcohol use.  He denies nicotine use.  He denies any history of CD treatment.          Past Medical History:     He has a history of elevated blood pressure but has never taken medications because \"I don't like medication.\"     No history of seizures or head injuries.         Past Surgical History:     None         Allergies:      Ibuprofen - GI disturbance, vomiting           Medications:     None          Social History:     He denies any history of trauma.  He lives with friends in [facility].  He is unemployed.  He is single.  He does not have children.         Family History:     Per ER notes, two aunts and one brother have a history of mental illness.  During the present assessment he declined to talk about his family history.         Labs:      Ref. Range 11/9/2021 09:30 11/9/2021 09:30 11/9/2021 10:33 11/9/2021 10:36   Sodium Latest Ref Range: 133 - 144 mmol/L 138 138     Potassium Latest Ref Range: 3.4 " - 5.3 mmol/L 4.6 5.0     Chloride Latest Ref Range: 94 - 109 mmol/L 110 (H) 110 (H)     Carbon Dioxide Latest Ref Range: 20 - 32 mmol/L 22 22     Urea Nitrogen Latest Ref Range: 7 - 30 mg/dL 35 (H) 36 (H)     Creatinine Latest Ref Range: 0.66 - 1.25 mg/dL 2.14 (H) 2.04 (H)     GFR Estimate Latest Ref Range: >60 mL/min/1.73m2 38 (L) 40 (L)     Calcium Latest Ref Range: 8.5 - 10.1 mg/dL 8.4 (L) 8.3 (L)     Anion Gap Latest Ref Range: 3 - 14 mmol/L 6 6     Albumin Latest Ref Range: 3.4 - 5.0 g/dL 2.8 (L)      Protein Total Latest Ref Range: 6.8 - 8.8 g/dL 7.7      Bilirubin Total Latest Ref Range: 0.2 - 1.3 mg/dL 0.4      Alkaline Phosphatase Latest Ref Range: 40 - 150 U/L 85      ALT Latest Ref Range: 0 - 70 U/L 23      AST Latest Ref Range: 0 - 45 U/L 28      Troponin I Latest Ref Range: 0.000 - 0.045 ug/L 0.039      Glucose Latest Ref Range: 70 - 99 mg/dL 73 72     Acetaminophen Level Latest Ref Range: 10 - 30 mg/L <2 (L)      Salicylate Level Latest Ref Range: <20 mg/dL <2      Alcohol Breath Test Latest Ref Range: 0.00 - 0.01     0.000   Amphetamine Qual Urine Latest Ref Range: Screen Negative    Screen Negative    Cocaine Qual Urine Latest Ref Range: Screen Negative    Screen Positive (A)    Benzodiazepine Qual Ur Latest Ref Range: Screen Negative    Screen Negative    Opiates Qualitative Urine Latest Ref Range: Screen Negative    Screen Negative    Cannabinoids Qual Urine Latest Ref Range: Screen Negative    Screen Negative    Barbiturates Qual Urine Latest Ref Range: Screen Negative    Screen Negative       Ref. Range 11/9/2021 11:05 11/9/2021 12:10 11/9/2021 12:48   Troponin I Latest Ref Range: 0.000 - 0.045 ug/L   0.041   WBC Latest Ref Range: 4.0 - 11.0 10e3/uL 8.8     Hemoglobin Latest Ref Range: 13.3 - 17.7 g/dL 11.5 (L)     Hematocrit Latest Ref Range: 40.0 - 53.0 % 35.2 (L)     Platelet Count Latest Ref Range: 150 - 450 10e3/uL 398     RBC Count Latest Ref Range: 4.40 - 5.90 10e6/uL 4.34 (L)     MCV  Latest Ref Range: 78 - 100 fL 81     MCH Latest Ref Range: 26.5 - 33.0 pg 26.5     MCHC Latest Ref Range: 31.5 - 36.5 g/dL 32.7     RDW Latest Ref Range: 10.0 - 15.0 % 14.6     % Neutrophils Latest Units: % 79     % Lymphocytes Latest Units: % 16     % Monocytes Latest Units: % 4     % Eosinophils Latest Units: % 0     % Basophils Latest Units: % 1     Absolute Basophils Latest Ref Range: 0.0 - 0.2 10e3/uL 0.1     Absolute Eosinophils Latest Ref Range: 0.0 - 0.7 10e3/uL 0.0     Absolute Immature Granulocytes Latest Ref Range: <=0.0 10e3/uL 0.0     Absolute Lymphocytes Latest Ref Range: 0.8 - 5.3 10e3/uL 1.4     Absolute Monocytes Latest Ref Range: 0.0 - 1.3 10e3/uL 0.4     % Immature Granulocytes Latest Units: % 0     Absolute Neutrophils Latest Ref Range: 1.6 - 8.3 10e3/uL 7.0     Absolute NRBCs Latest Units: 10e3/uL 0.0     NRBCs per 100 WBC Latest Ref Range: <1 /100 0     SARS CoV2 PCR Latest Ref Range: Negative   Negative             Psychiatric Examination:     Appearance:  awake, alert, disheveled  Attitude:  evasive and guarded  Eye Contact:  fair  Mood:  anxious, depressed and irritable  Affect:  mood congruent  Speech:  clear, coherent, minimal spontaneous speech  Psychomotor Behavior:  no evidence of tardive dyskinesia, dystonia, or tics  Thought Process:  linear and goal oriented  Associations:  no loose associations  Thought Content:  denies homicidal ideation, reports suicidal ideation with a plan to overdose and contracts for safety on the unit, reports visual hallucinations of shadows, denies auditory hallucinations, cannot rule out paranoid/delusional thought content  Insight:  limited  Judgment:  limited  Oriented to:  month/year, person, place  Attention Span and Concentration:  limited  Recent and Remote Memory:  fair  Language:  intact, fluent English  Fund of Knowledge:  appropriate  Muscle Strength and Tone:  normal  Gait and Station:  normal     BP (!) 154/100   Pulse 94   Temp 98.8  F (37.1  " C) (Oral)   Resp 16   Ht 1.778 m (5' 10\")   Wt 69.4 kg (152 lb 14.4 oz)   SpO2 100%   BMI 21.94 kg/m           Physical Exam:     Please refer to the physical exam completed by Dr. Mata in the ER 11/9/2021:    Constitutional:       General: He is not in acute distress.     Appearance: He is well-developed. He is not diaphoretic.   HENT:      Head: Normocephalic and atraumatic.      Nose: Nose normal.   Eyes:      General: No scleral icterus.     Conjunctiva/sclera: Conjunctivae normal.   Cardiovascular:      Rate and Rhythm: Regular rhythm. Tachycardia present.   Pulmonary:      Effort: Pulmonary effort is normal. No respiratory distress.      Breath sounds: No stridor.   Abdominal:      General: There is no distension.      Palpations: Abdomen is soft.      Tenderness: There is no abdominal tenderness. There is no guarding.   Musculoskeletal:         General: No deformity or signs of injury. Normal range of motion.      Cervical back: Normal range of motion and neck supple. No rigidity.   Skin:     General: Skin is warm and dry.      Coloration: Skin is not jaundiced or pale.      Findings: No rash.             Comments: Chronic wounds on anterior knees from picking at scabs. No redness or drainage. No bleeding. Few  crusted ulcerations on face.   Neurological:      General: No focal deficit present.      Mental Status: He is alert and oriented to person, place, and time.   Psychiatric:         Attention and Perception: Attention normal.         Mood and Affect: Mood is anxious and depressed.         Speech: Speech normal.         Behavior: Behavior is agitated. Behavior is cooperative.         Thought Content: Thought content includes suicidal ideation. Thought content includes suicidal plan.         Judgment: Judgment is impulsive.       "

## 2021-11-10 NOTE — PLAN OF CARE
"  Problem: Depressive Symptoms  Goal: Depressive Symptoms  Description: Signs and symptoms of listed problems will be absent or manageable.  Outcome: No Change     Patient transferred to the unit from station 10.  He came at around 1810. He verbalized not wanting to be bothered because he's tired of being moved \"everywhere\". He went straight to his room and to bed. He slept for like an hour and asked for food as soon as he woke up. He was frustrated when he only got snacks instead of a regular meal. Said he never ate anything today. Was able to acquire sandwich for the patient. He agreed to answer admission/assessment questions thereafter but he did not want to leave his bed. Observed to be irritable at times. Rated anxiety 8/10 and depression 9/10. Wished to be dead but denies having plans to hurt himself or others and he contracted for safety. Denied having AH and VH. Complained of headache and was given PRN Tylenol which helped. Sleeping in bed at present.   "

## 2021-11-10 NOTE — PLAN OF CARE
"RN Note:    Patient presents with tense and irritable affect and anxious and irritable mood. Patient was irritable and guarded during frequent interactions throughout the shift. Patient denies  SI, SIB, HI, and AVH, but is endorsing thoughts of wishing he were dead. Patient contracts for safety and states \"every day I wish I weren't alive but I don't have any plan.\" Patient reports depression and anxiety 8/10 and headache 7/10 (see MAR). Patient was isolative to his room this shift, coming out for meals only. Patient  did not attend groups. Patient is med-compliant. No medication side effects observed or endorsed by patient.    Incidents to note:    Abrasion's on bilateral knees cleansed and dressed, bacitracin applied. Patient declined to explain how he got the abrasions. No drainage or discharge observed. No signs of infection noted. Nursing will continue to monitor.    PRNs given this shift:    Tylenol 650mg - 1007 and 1450 for headache 7/10 reported some relief  Zyprexa 5mg - 1007 for agitation and anxiety reported some relief  Hydralazine 12.5mg - 0848 and 1450 for HTN    BP (!) 159/122 (BP Location: Left arm, Patient Position: Sitting)   Pulse 112   Temp 97.4  F (36.3  C) (Temporal)   Resp 18   Ht 1.778 m (5' 10\")   Wt 69.4 kg (152 lb 14.4 oz)   SpO2 100%   BMI 21.94 kg/m      "

## 2021-11-10 NOTE — PLAN OF CARE
"Initial Psychosocial Assessment    I have reviewed the chart, met with the patient, and developed Care Plan.  Information for assessment was obtained from: Chart: Pt did not engage in assessment. Pt was observed sleeping throughout the shift      Presenting Problem:  Olvin Jaimes is a 41-year-old male. He voluntarily admitted  Through the ER to [facility] on 11/9/2021 after using 2 g of cocaine and smoking cannabis in an attempt to commit suicide. UTOX was positive for cocaine.     History of Mental Health and Chemical Dependency:  He has no prior history of psychiatric hospitalizations or treatment for mental illness.  He has never taken psychotropic medications.  With regard to suicide attempts, he states he has been attempting to kill himself \"every day for weeks\" through use of substances.     He reports using Cannabis and Cocaine. He denies any history of IV use.  He denies alcohol use.  He denies nicotine use.  He denies any history of CD treatment.    Family Description (Constellation, Family Psychiatric History):  Per ER notes, two aunts and one brother have a history of mental illness    Significant Life Events (Illness, Abuse, Trauma, Death):  He reported having sexual intercourse with a female who has HIV a week prior to admission and HIV testing was ordered with results pending. In the ER he appeared to be responding to internal stimuli and reported that visual hallucinations of shadows have been present for years and denies auditory hallucinations.      Living Situation:  Unknown at this time. Pt did not disclose in charting    Educational Background:  Unknown at this time. Pt did not disclose in charting    Occupational History:  Unknown at this time. Pt did not disclose in charting    Financial Status:  Unknown at this time. Pt did not disclose in charting    Legal Issues:  Unknown at this time. Pt did not disclose in charting    Ethnic/Cultural Issues:  He reported having given up " his Confucianism beliefs months ago    Spiritual Orientation:  Unknown at this time. Pt did not disclose in charting     Service History:  Unknown at this time. Pt did not disclose in charting    Social Functioning (organization, interests):  Unknown at this time. Pt did not disclose in charting    Current Treatment Providers are:  Unknown at this time. Pt did not disclose in charting    Social Service Assessment/Plan:  Patient will have psychiatric assessment and medication management by the psychiatrist. Medications will be reviewed and adjusted per MD as indicated. The treatment team will continue to assess and stabilize the patient's mental health symptoms with the use of medications and therapeutic programming. Hospital staff will provide a safe environment and a therapeutic milieu. Staff will continue to assess patient as needed. Patient will participate in unit groups and activities. Patient will receive individual and group support on the unit.     CTC will do individual inpatient treatment planning and after care planning. CTC will discuss options for increasing community supports with the patient. CTC will coordinate with outpatient providers and will place referrals to ensure appropriate follow up care is in place.

## 2021-11-10 NOTE — PLAN OF CARE
Tasks Complete:  CTC completed psychosocial via chart. CTC attempted 3x to meet with pt through out the shift but pt was observed sleeping through out the shift and declined to engage at this time. CTC will follow up tomorrow with pt.    Daily updates:  Post round meeting with team @9:30 am updates:  CTC met with team to discuss med changes, follow ups, group attendance,and behaviors.    Current Symptoms include the following: PT was observed sleeping through out the shift.     Addressed patient needs/concerns: PT did not disclose needs or concerns at this time.    Discharge Plan or Goal  Unknown at this time. Ongoing stabilization is required      Barriers to Discharge   Ongoing Stabilization      Referral Status  No referral have been made yet.     Legal Status  Voluntary

## 2021-11-11 LAB
ALBUMIN SERPL-MCNC: 2.8 G/DL (ref 3.4–5)
ALP SERPL-CCNC: 86 U/L (ref 40–150)
ALT SERPL W P-5'-P-CCNC: 16 U/L (ref 0–70)
ANION GAP SERPL CALCULATED.3IONS-SCNC: 3 MMOL/L (ref 3–14)
AST SERPL W P-5'-P-CCNC: 17 U/L (ref 0–45)
BILIRUB SERPL-MCNC: 0.2 MG/DL (ref 0.2–1.3)
BUN SERPL-MCNC: 21 MG/DL (ref 7–30)
CALCIUM SERPL-MCNC: 8.2 MG/DL (ref 8.5–10.1)
CHLORIDE BLD-SCNC: 110 MMOL/L (ref 94–109)
CHOLEST SERPL-MCNC: 151 MG/DL
CO2 SERPL-SCNC: 27 MMOL/L (ref 20–32)
CREAT SERPL-MCNC: 1.48 MG/DL (ref 0.66–1.25)
GFR SERPL CREATININE-BSD FRML MDRD: 59 ML/MIN/1.73M2
GLUCOSE BLD-MCNC: 64 MG/DL (ref 70–99)
HDLC SERPL-MCNC: 68 MG/DL
LDLC SERPL CALC-MCNC: 73 MG/DL
NONHDLC SERPL-MCNC: 83 MG/DL
POTASSIUM BLD-SCNC: 4.3 MMOL/L (ref 3.4–5.3)
PROT SERPL-MCNC: 7.6 G/DL (ref 6.8–8.8)
SODIUM SERPL-SCNC: 140 MMOL/L (ref 133–144)
TRIGL SERPL-MCNC: 51 MG/DL
TSH SERPL DL<=0.005 MIU/L-ACNC: 1.28 MU/L (ref 0.4–4)

## 2021-11-11 PROCEDURE — 36415 COLL VENOUS BLD VENIPUNCTURE: CPT | Performed by: PSYCHIATRY & NEUROLOGY

## 2021-11-11 PROCEDURE — 99207 PR CONSULT E&M CHANGED TO INITIAL LEVEL: CPT | Performed by: PHYSICIAN ASSISTANT

## 2021-11-11 PROCEDURE — 250N000013 HC RX MED GY IP 250 OP 250 PS 637: Performed by: PHYSICIAN ASSISTANT

## 2021-11-11 PROCEDURE — 250N000013 HC RX MED GY IP 250 OP 250 PS 637: Performed by: PSYCHIATRY & NEUROLOGY

## 2021-11-11 PROCEDURE — 124N000002 HC R&B MH UMMC

## 2021-11-11 PROCEDURE — 80061 LIPID PANEL: CPT | Performed by: PSYCHIATRY & NEUROLOGY

## 2021-11-11 PROCEDURE — 250N000013 HC RX MED GY IP 250 OP 250 PS 637: Performed by: NURSE PRACTITIONER

## 2021-11-11 PROCEDURE — 99233 SBSQ HOSP IP/OBS HIGH 50: CPT | Performed by: NURSE PRACTITIONER

## 2021-11-11 PROCEDURE — 99223 1ST HOSP IP/OBS HIGH 75: CPT | Performed by: PHYSICIAN ASSISTANT

## 2021-11-11 PROCEDURE — 84443 ASSAY THYROID STIM HORMONE: CPT | Performed by: PSYCHIATRY & NEUROLOGY

## 2021-11-11 PROCEDURE — 80053 COMPREHEN METABOLIC PANEL: CPT | Performed by: PSYCHIATRY & NEUROLOGY

## 2021-11-11 RX ORDER — HYDRALAZINE HYDROCHLORIDE 25 MG/1
25 TABLET, FILM COATED ORAL ONCE
Status: COMPLETED | OUTPATIENT
Start: 2021-11-11 | End: 2021-11-11

## 2021-11-11 RX ORDER — AMLODIPINE BESYLATE 5 MG/1
5 TABLET ORAL DAILY
Status: DISCONTINUED | OUTPATIENT
Start: 2021-11-11 | End: 2021-11-12

## 2021-11-11 RX ORDER — HYDRALAZINE HYDROCHLORIDE 25 MG/1
25 TABLET, FILM COATED ORAL EVERY 6 HOURS PRN
Status: DISCONTINUED | OUTPATIENT
Start: 2021-11-11 | End: 2021-11-12 | Stop reason: HOSPADM

## 2021-11-11 RX ORDER — LABETALOL 100 MG/1
100 TABLET, FILM COATED ORAL EVERY 8 HOURS PRN
Status: DISCONTINUED | OUTPATIENT
Start: 2021-11-11 | End: 2021-11-12 | Stop reason: HOSPADM

## 2021-11-11 RX ADMIN — ACETAMINOPHEN 650 MG: 325 TABLET, FILM COATED ORAL at 22:48

## 2021-11-11 RX ADMIN — AMLODIPINE BESYLATE 5 MG: 5 TABLET ORAL at 14:18

## 2021-11-11 RX ADMIN — OLANZAPINE 10 MG: 2.5 TABLET, FILM COATED ORAL at 22:48

## 2021-11-11 RX ADMIN — Medication 12.5 MG: at 08:27

## 2021-11-11 RX ADMIN — Medication 12.5 MG: at 10:38

## 2021-11-11 RX ADMIN — HYDRALAZINE HYDROCHLORIDE 25 MG: 25 TABLET ORAL at 22:48

## 2021-11-11 RX ADMIN — HYDRALAZINE HYDROCHLORIDE 25 MG: 25 TABLET ORAL at 14:25

## 2021-11-11 RX ADMIN — ACETAMINOPHEN 650 MG: 325 TABLET, FILM COATED ORAL at 10:43

## 2021-11-11 ASSESSMENT — ACTIVITIES OF DAILY LIVING (ADL)
LAUNDRY: WITH SUPERVISION
DRESS: SCRUBS (BEHAVIORAL HEALTH);INDEPENDENT
ORAL_HYGIENE: INDEPENDENT
HYGIENE/GROOMING: INDEPENDENT

## 2021-11-11 NOTE — PROGRESS NOTES
"[facility],  Psychiatric Progress Note      Impression:     Olvin Jaimes is a 41-year-old male admitted to [facility] on 11/9/2021.  He was admitted as a voluntary patient through the ER after using 2 g of cocaine and smoking cannabis in an attempt to commit suicide.  He believes the cannabis was laced with something and may have been K2 as it did not affect him as it usually does.  His vision was abnormal and he became nauseated.  UTOX was positive for cocaine.  He reported having sexual intercourse with a female who has HIV a week prior to admission and HIV testing was ordered with results pending.  He reported having given up his Zoroastrian beliefs months ago.  In the ER he appeared to be responding to internal stimuli and reported that visual hallucinations of shadows have been present for years.  He was initially admitted to Station 10N however a female patient was following him and expressed romantic interest, so he was transferred to 32N.  He was not taking any psychotropic medications prior to admission.  He was irritable, guarded and dismissive during the initial assessment.  When asked about stressors, he responded, \"life.\"  He reports that has been attempting to kill himself \"every day for weeks\" through use of substances.  When about his recent use of cocaine he responded, that he uses \"not that much.\"  When asked about his use of cannabis, he responded, \"not that much anymore.\"  He refused to allow provider to examine the wounds on his knees.  He said he does not like to take medications but is tentatively willing to do so.  Since admission, Zyprexa was initiated.  PRNs of Zyprexa, Hydroxyzine and Trazodone were initiated.  Mood remains irritable, depressed and anxious.  He has some ongoing paranoid thought content.  A rapid response was called on 11/11 due to his endorsing the worst headache of his life in the context of hypertensive " urgency verses emergency at which time he refused head CT and IV fluids and was taken to the ER.  During the time of the rapid response he made statements indicative of paranoid thought content which appeared to be influencing his decision to refuse appropriate medical care, and he was placed on a 72-hour hold.         DIagnoses:      Unspecified psychosis, likely substance-induced  Unspecified depressive disorder, possibly substance-induced  Cannabis abuse  Stimulant (cocaine) abuse  Hypertension   Wounds on bilateral knees         Plan:     Medications:  Continue Zyprexa 5 mg at HS.  PRNs of Zyprexa, Trazodone and Hydroxyzine are available.    Internal medicine continuing to follow for elevated blood pressure.    WOC RN consult for wounds on bilateral knees.     He is on a 72-hour hold due to suicidal ideation and refusal of medical care influenced by paranoia in an urgent/emergent situation.  Psychosis is likely substance-induced, so would recommend re-evaluating daily.  He reports having housing and will likely discharge there when stable.  He does not currently have outpatient providers.  Recommend offering resources for CD treatment.      Transfer care to Dr. Calloway.        Attestation:  Patient has been seen and evaluated by me, LELE Rodríguez CNP  The patient was counseled on nature of illness and treatment plan/options  Care was coordinated with treatment team         Clinical Global Impressions  First:  Considering your total clinical experience with this particular patient population, how severe are the patient's symptoms at this time?: 7 (11/10/21 0851)  Compared to the patient's condition at the START of treatment, this patient's condition is: 4 (11/10/21 0851)  Most recent:  Considering your total clinical experience with this particular patient population, how severe are the patient's symptoms at this time?: 7 (11/10/21 0851)  Compared to the patient's condition at the START of  "treatment, this patient's condition is: 4 (11/10/21 0851)             Interim History:     The patient's care was discussed with the treatment team and chart notes were reviewed.  Pt was documented as sleeping 6.25 hours during the overnight shift.  He took PRN Tylenol x 4, PRN Maalox  x 1, PRN Hydralazine x 2, PRN Hydroxyzine x 1 and PRN Zyprexa x 1 yesterday.  Pt relieved to hear HIV test was negative.  He allowed provider to examine bilateral leg wounds and is agreeable to [facility] RN consult.  Pt appeared in moderate distress and stated he was experiencing \"the worst headache of my life.\"  BP elevated.  Internal medicine notified and consult order placed.  Stat head CT also ordered.  Pt states he had difficulty sleeping due to his headache and has not had suicidal thoughts due to the severity of his headache.  He denies all psychotic symptoms but was guarded and appeared disinterested in discussing anything other than his headache.      ADDENDUM:  The patient refused stat head CT ordered by internal medicine.  He said he did not want to be \"under a machine\" and expressed a distrust of the treatment team.  He said that his blood pressure had never been so elevated until he was hospitalized.  Provider and internal medicine attempted to educate him on the importance of receiving medical care.  Rapid response was called.  He appeared agitated and continued to make paranoid statements.  He denied suicidal ideation and stated that he no longer had a headache.  He said that he presented to the ER because he was feeling stressed about the possibility of brandy HIV from a woman with a known infection with whom he had sexual intercourse prior to admission, was relieved to hear that his lab result was negative, and therefore feels as though the symptoms which led to his hospitalization have resolved.  Provider expressed concerns about him refusing medical care in an urgent situation based on paranoid thoughts and informed " "him a 72-hour hold would be placed.  He stated several times that he did not want to work with provider.  He was escorted to the ER by the rapid response team.  Once cleared for transfer back to psychiatry, his care will be assumed by Dr. Calloway.  Provider his discussed circumstances with Dr. Calloway.         Medications:     Current Facility-Administered Medications   Medication     acetaminophen (TYLENOL) tablet 650 mg     alum & mag hydroxide-simethicone (MAALOX) suspension 30 mL     bacitracin ointment     hydrALAZINE (APRESOLINE) tablet 25 mg     hydrOXYzine (ATARAX) tablet 25 mg     OLANZapine (zyPREXA) tablet 5-10 mg    Or     OLANZapine (zyPREXA) injection 10 mg     OLANZapine (zyPREXA) tablet 5 mg     senna-docusate (SENOKOT-S/PERICOLACE) 8.6-50 MG per tablet 1 tablet     traZODone (DESYREL) tablet 50 mg             Allergies:     Allergies   Allergen Reactions     Ibuprofen GI Disturbance     vomiting            Psychiatric Examination:     BP (!) 159/93 (Patient Position: Sitting)   Pulse 111   Temp 98.2  F (36.8  C) (Oral)   Resp 18   Ht 1.778 m (5' 10\")   Wt 69.4 kg (152 lb 14.4 oz)   SpO2 100%   BMI 21.94 kg/m      Appearance:  awake, alert, disheveled, appears in moderate distress  Attitude:  guarded  Eye Contact:  fair  Mood:  anxious, depressed and irritable  Affect:  irritable  Speech:  clear, coherent  Psychomotor Behavior:  no evidence of tardive dyskinesia, dystonia, or tics  Thought Process:  linear and goal oriented but illogical  Associations:  no loose associations  Thought Content:  denies homicidal ideation, stated headache severity precluded the possibility of his experiencing suicidal ideation then later denied suicidal ideation, denies hallucinations, paranoia is present  Insight:  limited  Judgment:  limited  Oriented to:  month/year, person, place  Attention Span and Concentration:  limited  Recent and Remote Memory:  fair  Language:  intact, fluent English  Fund of Knowledge: "  appropriate  Muscle Strength and Tone:  normal  Gait and Station:  normal          Labs:     No results found for this or any previous visit (from the past 24 hour(s)).

## 2021-11-11 NOTE — CONSULTS
"  [facility]  Internal Medicine Consult     Olvin Jaimes MRN# [id number]   Age: 41 year old YOB: 1980     Date of Admission: 11/9/2021  Date of Consult: 11/11/2021    Primary Care Provider: No primary care provider on file.    Requesting Service: Behavioral Health - Radha Calloway MD  Reason for Consult: General Medical Evaluation      SUBJECTIVE   CC:   HTN    Assessment and Plan/Recommendations:   Olvin Jaimes is a 41 year old male with history of polysubstance abuse and depression who was admitted to station 32 with SI     Polysubstance abuse, depression with SI: Admitted to station with 32 with plan to overdose to kill self. Patient was placed on a 72 hour hold by psychiatry team during RRT this morning due to concern for paranoia, delusions leading to inability to make complex medical decisions  - Management per psych     HTN urgency vs emergency: BP labile but persistently elevated in the setting of psychiatric decompensation. Patient reported \"worst headache of his life\" to psychiatry prompting STAT medicine eval. Neuro exam reassuring, but patient continued to report of worst headache of his life. Patient was going to go down for STAT head CT but abruptly refused. At this time RRT called due to concern for HTN emergency. During RRT patient became more agitated and 72 hour hold was placed, see above. Patient eventually calmed down and was agreeable to transfer to the ED. He reported his headache completely resolved and that he did not need head CT. Refusal made on several attempts made by writer, nursing, RRT team, ED team. BP 160s/120s in the ED  - The following were ordered/suggested but patient is refusing: stat head CT ordered but patient refused, IV access for IVF/IV antihypertensives, tele, repeat labs  - EKG  - Hydralazine 25 mg x1 now  - Given that underlying psychiatric decompensation is likely driving BP, will medically stabilize then transfer back to psychiatry " "for ongoing care  - Start scheduled amlodipine 5 mg daily  - Contact medicine STAT with any focal changes or concerns   - Case d/w Dr. Hodges who agrees with current plan     SOLO vs SOLO on CKD: Cr 2.14 on admission. No prior for comparison. Repeat Cr improved to 1.48. possible that patient is back to BL vs ongoing SOLO  - Encourage oral intake. Ideally would give IVF but patient refusing access  - Repeat BMP 11/12      Medicine will continue to follow. Please contact if future questions or concerns arise. Thank you for the opportunity to be a part of this patient's care.      Roewna Henderson PA-C  Internal Medicine [facility] Hospitalist  Page job code [id number] (3B), [phone] (3A), or [phone] ([facility] and )  Text paging via [employer] is appreciated  November 11, 2021         HPI:   Olvin Jaimes is a 41 year old male with history of polysubstance abuse and depression who was admitted to station 32 with SI     This morning patient reported the \"worst headache of his life\" to psychiatry team.  This is in the setting of uncontrolled blood pressure.  Writer asked to assess patient.  Patient reports that he does not typically have headaches.  He states this is the worst headache he has ever had.  He is have seeing flashing lights and occasionally double vision.  Denies any focal weakness, numbness, difficulty moving one side or one limb.  Patient denies history of hypertension.  States his blood pressure has been worsening this admission.  Denies history of stroke or heart conditions.  Patient reports that he is otherwise medically healthy.    Patient is initially agreeable to head CT.  After getting up and into a wheelchair to go down for head CT, he abruptly got up and stated he would not be going for CT.  Due to concerns for hypertensive emergency and possible complications, RRT was called.  Intermittently became agitated staff, plan for head CT, plan to go to ED, ongoing psychiatric admission.  He threatened to leave at times " "per psychiatry paranoia behavior escalated.  He was placed on a per psychiatry during the RRT.  Patient is educated by multiple people including writer, nursing, RRT team about the risks of refusing head CT and complications of hypertensive emergency.  Shortly after these discussions patient reports headache completely resolved.  Patient states he not getting any further lab work-up, IV access, heart monitoring, or head CT.  Eventually he is agreeable to go to the ED for further evaluation but reiterates that he will refuse most care.  He is currently agreeable to oral medications for blood pressure.       Past Medical History:   History reviewed. No pertinent past medical history.     Reviewed and updated in [employer].     Past Surgical History:    History reviewed. No pertinent surgical history.      Social History:     Social History     Tobacco Use     Smoking status: Never Smoker     Smokeless tobacco: Never Used   Substance Use Topics     Alcohol use: Yes     Comment: daily for the past 2 weeks     Drug use: Yes     Types: Marijuana, Cocaine        Family History:   History reviewed. No pertinent family history.      Allergies:     Allergies   Allergen Reactions     Ibuprofen GI Disturbance     vomiting         Medications:   Reviewed. Please see MAR     Review of Systems:   10 point ROS of systems including Constitutional, Eyes, Respiratory, Cardiovascular, Gastroenterology, Genitourinary, Integumentary, Muscularskeletal, Psychiatric were all negative except for pertinent positives noted in my HPI.    OBJECTIVE   Physical Exam:   Vitals were reviewed  Blood pressure (!) 167/127, pulse 98, temperature 98  F (36.7  C), temperature source Oral, resp. rate 16, height 1.778 m (5' 10\"), weight 69.4 kg (152 lb 14.4 oz), SpO2 99 %.  General: Alert and oriented x3, intermittently calm then agitated   HEENT: Anicteric sclera, MMM  Cardiovascular: RRR, S1S2. No murmur noted  Lungs: CTAB without wheezing or crackles   GI: " Abdomen soft, non-tender with bowel sounds present. No guarding or rebound   Vascular: No peripheral edema, distal pulses palpable  Neurologic: CN II-XII intact. PERRLA, EOMI. Normal gait and station. Normal coordination of movement. Strength 5/5 in the BUE and BLE. Normal gross sensation. No focal deficits   Skin: No jaundice, rashes, or lesions        Data:        Lab Results   Component Value Date     11/11/2021    Lab Results   Component Value Date    CHLORIDE 110 11/11/2021    Lab Results   Component Value Date    BUN 21 11/11/2021      Lab Results   Component Value Date    POTASSIUM 4.3 11/11/2021    Lab Results   Component Value Date    CO2 27 11/11/2021    Lab Results   Component Value Date    CR 1.48 11/11/2021        Lab Results   Component Value Date    WBC 8.8 11/09/2021    HGB 11.5 (L) 11/09/2021    HCT 35.2 (L) 11/09/2021    MCV 81 11/09/2021     11/09/2021     Lab Results   Component Value Date    WBC 8.8 11/09/2021

## 2021-11-11 NOTE — PLAN OF CARE
Tasks Complete:  CTC was not able to met w/ pt due to pt transferring to medical/ED to address medical concerns.     Daily updates:  Post round meeting with team @9:30 am updates: CTC met with team to discuss med changes, follow ups, and group attendance    Current Symptoms include the following:  medical symptoms, paranoia, and resistance.     Addressed patient needs/concerns: CTC was not able to met w/ pt due to transferring to medical/ED to address medical concerns.    Discharge Plan or Goal  Pt requires ongoing stabilization . Likely discharge to his home.     Barriers to Discharge   Ongoing stabilization     Referral Status  No referrals have been made yet. Pt requires ongoing stabilization.     Legal Status  Pt placed on 72HH

## 2021-11-11 NOTE — ED NOTES
"Patient refused EKG. This writer informed the patient that this test was a very important test. Patient stated: \"for the fifth time, I don't want any testing. I will cooperate when I am allowed to go home.\"  "

## 2021-11-11 NOTE — PROGRESS NOTES
Brief medicine note:    Medicine asked to chart review patient due to hypertension.  Blood pressure ranging 140s-170s/90s-120s.  Per chart review patient seems to be continue to have psychiatric decompensation.  No history of hypertension documented in chart.  No prior to admission antihypertensive meds.  -Given ongoing psychiatric decompensation, will hold off on starting scheduled antihypertension for now  -Increase as needed hydralazine to 25 mg every 6 hours as needed  -Medicine will continue to follow blood pressure and will start antihypertensive medication in the next 24 to 72 hours if indicated    Rowena Henderson PA-C  Internal Medicine [facility]  [phone]

## 2021-11-11 NOTE — PROGRESS NOTES
Brief Medicine Note:    Medicine closely following throughout the day, see prior note by writer for details. At this time blood pressure remains elevated at 174/126. Patient intermittently refusing medications and cares. He has been refusing labetalol this afternoon. On numerous occasions patient has denied any ongoing headache or other s/s HTN emergency. I suspect that psychiatric decompensation highly contributory to uncontrolled BP. Given that BP is c/w prior trends on station 32 and that patient is refusing medical management, writer is ok with transfer back to station 32 with ongoing close monitoring. Please contact medicine stat with any new headache, AMS, visual changes, lethargy, chest pain, dyspnea, abdominal pain, N/V, or other symptoms of concern. Please continue to encourage compliance with treatment. This was d/w Dr. Hodges who agrees with current plan. Patient will be signed out to the night team for ongoing monitoring     Rowena Henderson PA-C  Internal Medicine [facility]  [phone]

## 2021-11-11 NOTE — ED NOTES
Patient refused to have EKG performed. The risks of avoiding an EKG was discussed with the patient including the risk of death. Patient acknowledged risk and declined EKG. Discussed this with Ellie DUARTE who did not give further orders.

## 2021-11-11 NOTE — PROGRESS NOTES
"Security called 3 times-no on unit. Mgr updated \"transport ASAP.\" Patient sent to ED with multiple staff present.  "

## 2021-11-11 NOTE — PROGRESS NOTES
NOC Shift Report    Pt in bed at beginning of shift, breathing quiet and unlabored. Pt slept most of the night. No pt complaints or concerns at this time. Will continue to monitor.

## 2021-11-11 NOTE — ED NOTES
Reoffered Labetalol again but pt refused even after risks to benefits discussed.  MD updated, per MD offer the med again in 10 mins if pt takes it recheck BP in 30 mins. Per MD pt can go back to the unit once /110.  Message relayed to [address]

## 2021-11-11 NOTE — CODE/RAPID RESPONSE
"Rapid Response Team Note    Assessment   In assessment a rapid response was called on Olvin Jaimes due to HTN and pt reporting \"worst HA of my Life.\". This presentation is likely due to HTN and worsened by Psychiatric underlying conditions involving paranoia.        Plan   -  Patient was willing to be taken to the ED for HTN evaluation.  - He declined Head CT and reported he had 0-1/10 HA at this time.    -  The Hospitalist primary team was present and managing th epatient.  -  Disposition: The patient was transferred to the ED for management  -  Reassessment and plan follow-up will be performed by the primary team    Mariela Hampton, APRN CNP  [facility] RRT AMCOM Job Code [employer] #[id number]    Hospital Course   Brief Summary of events leading to rapid response:   Patient with PMH including cocaine use, recent hallucinations, admitted for suicidal ideation.  He was admitted to Behavioral Health.  I was called to Unit 32 for HTN emergency with patient reporting to the Hospitalist team that he was having \"the worst HA of my life.\"    He was clearly agitated and having paranoid thoughts.  He adamantly declined Head CT, telemetry, or IV medication. He was agreeable to go to the ED for BP monitoring and Hospitalist team was discussing po medication with him.    He declined a neuro exam from me but Hospitalist assured he she did one and it was unremarkable.   He was HD stable.  The Hospitalist MAN was agreeable with resuming care for this patient and will contact us on ICU if patient worsens.     Admission Diagnosis:   Suicidal ideation [R45.851]     Physical Exam   Temp: 99.8  F (37.7  C) Temp  Min: 98.2  F (36.8  C)  Max: 99.8  F (37.7  C)    No data recorded  SpO2: 99 % SpO2  Min: 99 %  Max: 100 %  Pulse: 116 Pulse  Min: 106  Max: 116    No data recorded  BP: (!) 172/134 Systolic (24hrs), Av , Min:159 , Max:177   Diastolic (24hrs), Av, Min:93, Max:134     I/Os: No intake/output data recorded.     Exam: "   GEN: in distress, anxious, frustrated, upset  EYES: Anicteric sclera.   HEENT:  Normocephalic, atraumatic,  CV: tachycardic per VS monitoring  PULM/CHEST: breathing comfortably on RA with Sats > 92%  EXTREMITIES: no peripheral edema, moving all extremities  NEURO: declined  PSYCH:  Affect: paranoid, anxious        Significant Results and Procedures   Lactic Acid: No results for input(s): LACT, LACTS in the last 17420 hours.  CBC:   Recent Labs   Lab Test 11/09/21  1105   WBC 8.8   HGB 11.5*   HCT 35.2*           Sepsis Evaluation   The patient is not known to have an infection.  NO evidence of sepsis at this time.

## 2021-11-11 NOTE — ED NOTES
Pt refusing CT scan and IV placement. Pt states he wants to go home. Writer gave pt paperwork for 72 hour hold.

## 2021-11-11 NOTE — ED NOTES
"Pt was offered his BP med. Refusing at the moment because he is eating and said, \"I won't be taking your medications. You're just keeping me here. I'd rather go to my primary.\"  "

## 2021-11-11 NOTE — PLAN OF CARE
"Pt.'s blood pressure in am 191/129, temperature 98.6, and pulse 52.  Hydralazine 12.5 given.  Blood pressure rechecked approximately one hour later,  Blood pressure 177/131, pulse 106, temperature 99.8, and oxygen 97%.  Internal medicine notified  and hydralazine 12.5  administered.  Psychiatric provider ordered stat consult with internal medicine.  Internal medicine ordered stat CT scan which the pt. Refuses. The pt. Had stated to his psychiatric provider that he was having the worse headache of his life.  A rapid response was called.   The pt. Earlier in the shift states to this staff that his depression is an 8 out of 10 and his anxiety is a 7 out of 10 with 10 being high.  The pt. States that he feels safe in the hospital; however when asked if he was safe outside the hospital he indicated verbally that he did not know if he could keep himself safe.  During the rapid response the 172/134, pulse 116, and oxygen level at 99%.  The pt. Was very agitated during this entire situation.  The pt. Repeatedly  refused his CT scan.   The pt. Verbalizes that he \"will not have any needles or will not be hooked up to any lines.\"   Pt. Transported to the ED at approximately 1110 with rapid response team and behavioral.   "

## 2021-11-11 NOTE — ED NOTES
Labetalol was re-offered again but pt continues to refuse even after risks to benefits was discussed.  MD updated on the above. Per MD she will call back

## 2021-11-11 NOTE — PLAN OF CARE
"  Problem: Depressive Symptoms  Goal: Depressive Symptoms  Description: Signs and symptoms of listed problems will be absent or manageable.  Outcome: No Change     Patient verbalized his frustration of being awakened many times today. Said he's not been resting well due to having nightmares when he sleeps. Was cooperative but irritable. Stayed in his room most of the time. Sleeping on and off. Rated anxiety 7/10 but declined to take any PRN med. Rated depression 6/10. Still endorsed passive SI stating he wished he was dead but has no plans of hurting himself. Denies SIB and HI. Also denies AH and VH. He took his supper tray to his room and after eating, he brought the dirty tray out and placed it on the floor in the hallway. Staff asked him to bring the tray to the cart but he ended up bringing the tray back to his room. He went to sleep thereafter.     Patient complained of frequent \"gagging\" when he woke up. Also complained of headache. PRN Maalox and Tylenol given. Med compliant. Will continue to monitor.  "

## 2021-11-11 NOTE — ED NOTES
FELICIA Henderson called back and stated that per discussion with her team pt needs psychiatric stabilization/management for HTN to be manged. PA asked writer to talk to ED Manager about the plan if there is push back on the MH unit then ED manager needs to talk to the MH unit Manager. Per ED Manager if PA has deemed pt safe to be discharged back to the MH unit then the unit has to take the pt.  This note above was relayed to Hilda [address]. Hilda encouraged to call FELICIA Henderson to have her concerns/questions addressed.  Per Hilda pt can be sent to the unit. Transport requested

## 2021-11-12 ENCOUNTER — APPOINTMENT (OUTPATIENT)
Dept: CT IMAGING | Facility: CLINIC | Age: 41
DRG: 897 | End: 2021-11-12
Attending: PHYSICIAN ASSISTANT

## 2021-11-12 VITALS
HEART RATE: 61 BPM | DIASTOLIC BLOOD PRESSURE: 102 MMHG | HEIGHT: 70 IN | RESPIRATION RATE: 16 BRPM | TEMPERATURE: 98.1 F | WEIGHT: 152.9 LBS | SYSTOLIC BLOOD PRESSURE: 154 MMHG | OXYGEN SATURATION: 100 % | BODY MASS INDEX: 21.89 KG/M2

## 2021-11-12 LAB
ANION GAP SERPL CALCULATED.3IONS-SCNC: 1 MMOL/L (ref 3–14)
BUN SERPL-MCNC: 21 MG/DL (ref 7–30)
CALCIUM SERPL-MCNC: 8.8 MG/DL (ref 8.5–10.1)
CHLORIDE BLD-SCNC: 109 MMOL/L (ref 94–109)
CO2 SERPL-SCNC: 28 MMOL/L (ref 20–32)
CREAT SERPL-MCNC: 1.38 MG/DL (ref 0.66–1.25)
GFR SERPL CREATININE-BSD FRML MDRD: 64 ML/MIN/1.73M2
GLUCOSE BLD-MCNC: 88 MG/DL (ref 70–99)
POTASSIUM BLD-SCNC: 4.4 MMOL/L (ref 3.4–5.3)
SODIUM SERPL-SCNC: 138 MMOL/L (ref 133–144)
T PALLIDUM AB SER QL AGGL: NON REACTIVE

## 2021-11-12 PROCEDURE — G0463 HOSPITAL OUTPT CLINIC VISIT: HCPCS | Mod: 25

## 2021-11-12 PROCEDURE — 70450 CT HEAD/BRAIN W/O DYE: CPT | Mod: 26 | Performed by: RADIOLOGY

## 2021-11-12 PROCEDURE — 250N000013 HC RX MED GY IP 250 OP 250 PS 637: Performed by: PHYSICIAN ASSISTANT

## 2021-11-12 PROCEDURE — 80048 BASIC METABOLIC PNL TOTAL CA: CPT | Performed by: PHYSICIAN ASSISTANT

## 2021-11-12 PROCEDURE — 99232 SBSQ HOSP IP/OBS MODERATE 35: CPT | Performed by: PHYSICIAN ASSISTANT

## 2021-11-12 PROCEDURE — 97602 WOUND(S) CARE NON-SELECTIVE: CPT

## 2021-11-12 PROCEDURE — 36415 COLL VENOUS BLD VENIPUNCTURE: CPT | Performed by: PHYSICIAN ASSISTANT

## 2021-11-12 PROCEDURE — 70450 CT HEAD/BRAIN W/O DYE: CPT

## 2021-11-12 PROCEDURE — 99239 HOSP IP/OBS DSCHRG MGMT >30: CPT | Performed by: PSYCHIATRY & NEUROLOGY

## 2021-11-12 PROCEDURE — 250N000013 HC RX MED GY IP 250 OP 250 PS 637: Performed by: PSYCHIATRY & NEUROLOGY

## 2021-11-12 RX ORDER — AMLODIPINE BESYLATE 5 MG/1
5 TABLET ORAL ONCE
Status: COMPLETED | OUTPATIENT
Start: 2021-11-12 | End: 2021-11-12

## 2021-11-12 RX ORDER — GINSENG 100 MG
CAPSULE ORAL 2 TIMES DAILY
Qty: 30 G | Refills: 0 | Status: SHIPPED | OUTPATIENT
Start: 2021-11-12

## 2021-11-12 RX ORDER — AMLODIPINE BESYLATE 5 MG/1
10 TABLET ORAL DAILY
Status: DISCONTINUED | OUTPATIENT
Start: 2021-11-13 | End: 2021-11-12 | Stop reason: HOSPADM

## 2021-11-12 RX ORDER — AMLODIPINE BESYLATE 10 MG/1
10 TABLET ORAL DAILY
Qty: 30 TABLET | Refills: 1 | Status: SHIPPED | OUTPATIENT
Start: 2021-11-13

## 2021-11-12 RX ADMIN — AMLODIPINE BESYLATE 5 MG: 5 TABLET ORAL at 08:32

## 2021-11-12 RX ADMIN — ACETAMINOPHEN 650 MG: 325 TABLET, FILM COATED ORAL at 08:32

## 2021-11-12 RX ADMIN — HYDRALAZINE HYDROCHLORIDE 25 MG: 25 TABLET ORAL at 08:32

## 2021-11-12 RX ADMIN — AMLODIPINE BESYLATE 5 MG: 5 TABLET ORAL at 09:09

## 2021-11-12 ASSESSMENT — ACTIVITIES OF DAILY LIVING (ADL)
ORAL_HYGIENE: INDEPENDENT
HYGIENE/GROOMING: INDEPENDENT
DRESS: SCRUBS (BEHAVIORAL HEALTH);INDEPENDENT
LAUNDRY: WITH SUPERVISION

## 2021-11-12 NOTE — PROGRESS NOTES
"Internal Medicine Progress Note      Assessment and plan:  Olvin Jaimes is a 41 year old male with history of polysubstance abuse and depression who was admitted to [facility] with SI. Please see initial medicine note 11/11 for complete details        HTN urgency vs emergency: BP 170s/130s with \"worst headache of my life\" 11/11 prompting immediate eval, RRT, transfer to the ED. Patient refused most interventions including head CT, EKG, tele, repeat labs, IV access/IVF/antiemetics, repeat vital checks, and scheduled/prn oral antiemetics. After close monitoring for decompensation, patient was transferred back to station 32.     Initially agreeable to head CT this am but then refused when it was time for transport. Patient is able to verbalize and seems to understand the risks associated with HTN emergency and why medical team wanted further workup. Repeat /102 when taken by writer after am meds. Patient appears calm. Neuro exam is reassuring  - I continue to suggest head CT, EKG but it appears as if patient understands the risks/benefits and is declining at this time. Will ultimately defer to psychiatry to determine decision making capacity  - Patient will need to continue Amlodipine and close follow up with PCP on dishcarge  - Medicine will continue to follow BP  - Please call ASAP with any neurologic focal changes or concerns   ** Addendum: Head CT no acute findings. Continue plan as above     SOLO vs SOLO on CKD: Cr 2.14 on admission. No prior for comparison. Cr improved to 1.38 (1.48) 11/12. possible that patient is back to BL vs ongoing SOLO  - Encourage oral intake  - Trend BMP with PCP on discharge       Objective:  BP (!) 181/114 (BP Location: Left arm)   Pulse 88   Temp 98.1  F (36.7  C) (Temporal)   Resp 16   Ht 1.778 m (5' 10\")   Wt 69.4 kg (152 lb 14.4 oz)   SpO2 100%   BMI 21.94 kg/m      Vitals signs reviewed and noted    GENERAL: Alert and oriented x3. Calm. Sitting in milieu   HEENT: Anicteric " sclera. MMM  CV: RRR, S1, S2. No murmur noted  RESPIRATORY: Effort normal on room air. Lungs CTAB with no wheezing or rales  GI: Abdomen soft, non-tender with active bowel sounds. No rebound or guarding  NEUROLOGICAL: CN II-XII intact. PERRLA, EOMI. Normal gait and station. Normal coordination of movement. Strength 5/5 in the BUE and BLE. Normal gross sensation. No focal deficits   EXTREMITIES: No peripheral edema. Intact bilateral pedal pulses  SKIN: No jaundice, no rash    Pertinent labs and procedures were reviewed     Subjective:   Feeling better today. Headache 1/10. Denies any AMS. Reports that he does not want his head scanned so he is now refusing head CT. Denies any weakness, numbness, difficulty moving on area of the body, issues with speech or eating, chest pain, dyspnea. Feels overwhelmed by people bothering him about his blood pressure. States that he knows the risks of uncontrolled HTN and that with a headache this is concerning for stroke. He says he will tell staff if he develops any headache, visual changes, weakness, numbness, or other new medical changes.     Rowena Henderson PA-C  Internal Medicine  [phone]

## 2021-11-12 NOTE — PLAN OF CARE
"Pt was transferred back from the ED to [facility] around 1715. Per report from ED RN, pt refused cares, meds, and further testing while in the ED. His BP continued to remain elevated throughout his time there with his last reading in the ED being 174/126 around 1600. Pt was cleared by internal medicine to return to unit with close monitoring for any emergence of new symptoms and use of 2 available PRN's for hypertension (parameters: >170/110). See related note from internal medicine provider.     Upon arrival to the unit, pt was tense and irritable. He remained appropriate in his interactions with this writer and other staff and communicated, \"I'm never coming to the hospital again. They don't help you. I don't need anything.\" Pt quickly went to his room and shut the door. He came out for dinner and was frustrated and angry d/t issues regarding his dinner tray. He did eat but communicated his anger loudly throughout his meal. Remained isolative to his room throughout remainder of shift.    Still hypertensive and tachycardic around 2100. BP= 167/114 and VY=329. Offered PRN Hydralazine but he declined despite encouragement. Offered scheduled Zyprexa but he also declined this. He was calm and appropriate in his interactions with writer. Denied any discomfort or new symptoms. Resting/sleeping in his room at this time.     Status update at 2300:  Writer went to assess pt after pt reported to a psych associate that he wanted Tylenol for headache and Zyprexa. Just before entering pt's room, writer could hear pt singing softly in his room. Upon entering his room, pt pleasantly greeted writer. He confirmed he wanted Tylenol for a \"mild headache\" and Zyprexa for feeling agitated/restless. Writer encouraged pt to take PRN Hydralazine for his high blood pressure, to which he agreed to. Pt was given PRN Tylenol (650mg), Zyprexa (10mg), and Hydralazine (25mg). He was calm and able to engage in a casual, social conversation with " "writer. He opened up about being worried about his high blood pressure but also stated he has \"other bigger things going on in his life.\" He directly stated, \"But I'm not as worried now- now I know I'm safe.\" Unclear what this was in reference to but may be to learning he is HIV negative after being exposed last week. Pt encouraged to inform oncoming RN of any new symptoms or ongoing needs. Pt agreeable to this.    Problem: Adult Inpatient Plan of Care  Goal: Plan of Care Review  Recent Flowsheet Documentation  Taken 11/11/2021 3728 by Hilda Haley  Plan of Care Reviewed With: patient     "
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
## 2021-11-12 NOTE — PROGRESS NOTES
Patient presents tense affect and irritable mood. Patient expressed frustration w/ multiple aspects of his care today (breakfast tray, frequency of vital signs, amount of medications and obtaining a CT scan). He continues to complain of a headache, rating it at 3/10 10 being the worst. PRN Tylenol 650mg administered. Patient intitially consented to obtain a CT scan, then when he saw a wheelchair and security, he refused. Patient continued to refuse even after risks versus benefits were discussed. Patient is denying all mental health symptoms. His insight is poor and judgement is impaired. His thoughts are paranoid. He is guarded, dismissive, and irritable during interactions. He has been complaint with scheduled and PRN anti-hypertensive's.     Addendum 10:55AM: Patient agreed to do CT scan if security did not escort. He was escorted by psych assoc. He was agreeable once he learned he will be discharging today.

## 2021-11-12 NOTE — PLAN OF CARE
"  Problem: Behavioral Health Plan of Care  Goal: Patient-Specific Goal (Individualization)  Flowsheets (Taken 11/12/2021 1314)  Patient Vulnerabilities:   limited support system   substance abuse/addiction   poor physical health   lacks insight into illness  Patient Personal Strengths:   expressive of emotions   expressive of needs  Note:   CTC met with pt to complete Personal Plan of Care to discuss reasons for hospitalization and discharge goals.     Pt reports the reason he is in the hospital is due to \" my blood pressure is high\" and his discharge goal is to \"leave here.\" CTC discussed establishing care as a goal and pt agreed and reports his goal is to \" get primary care and psychiatry\".        "

## 2021-11-12 NOTE — PROGRESS NOTES
Pt woke up around midnight. Requested tylenol for headache, could not rate it. Pt informed that he had been given tylenol  and was not due for another 2 hrs. Writer offered to check pt vitals. Pt quietly walked away.Pt on status 15 min checks. No other concerns.Will continue to monitor.

## 2021-11-12 NOTE — PLAN OF CARE
"Patient discharging 11/12/2021 accompanied by self and destination is home. Discharge paperwork and medications reviewed with Patient who verbalizes understanding.     Patient denies current or recent suicidal ideation    SIB denies    HI: denies current or recent homicidal ideation or behaviors.  Patient denies pain, chest pain, shortness of breath, dizziness, headache upon discharge.     Patient completed Personal Plan of Care, identifying reasons for hospitalization and goals for discharge. Copy of AVS provided. Patient voiced no concerns regarding treatment plan. Patient stated all questions have been answered. Patient supplied with bus token and escorted by psych assoc to security to  belongings.    Survey provided.    Last vital signs  BP (!) 154/102   Pulse 61   Temp 98.1  F (36.7  C) (Temporal)   Resp 16   Ht 1.778 m (5' 10\")   Wt 69.4 kg (152 lb 14.4 oz)   SpO2 100%   BMI 21.94 kg/m    "
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
## 2021-11-12 NOTE — PLAN OF CARE
Tasks Complete:    CTC met with pt to sign STELLA, and complete personal plan of care. Pt signed STELLA for psychiatry. Documents charted    PT does not have insurance, CTC made referral to financial support through [employer]. Financial support called and pt declined to engage in completing the application.     CTC met with pt to discuss the role of financial support and explained that the pt will receive a bill if he does not have insurance and cannot schedule an appointment for psychiatry and primary care with out insurance. Pt declined to have insurance and follow up appointments.     CTC noted on [facility] resources for MA application and clinics that near the pt's address where he can be seen for both psychiatry and primary care.     Daily updates:  Post round meeting with team @9:30 am updates: PT is resisting care and refuses to follow providers recommendation, pt is observed in his room majority of the shift and has not attended group.      Current Symptoms include the following:  medical symptoms, paranoia, and resistance.      Addressed patient needs/concerns: CTC met with pt to discuss discharge planning- pt signed stella and personal plan of care. CTC met with pt to inform pt that he does not have insurance and that CTC can assist - pt declined. Pt also refused follow up appointments for psych and primary care.      Discharge Plan or Goal  Pt requires ongoing stabilization . Likely discharge to his home.     Barriers to Discharge   Ongoing stabilization     Referral Status  No referrals have been made yet. Pt requires ongoing stabilization.     Legal Status  Voluntary

## 2021-11-12 NOTE — PROVIDER NOTIFICATION
11/12/21 0827 11/12/21 1008   Vital Signs   Pulse 88 61   Pulse Rate Source Monitor Monitor;Brachial   BP (!) 181/114 (!) 154/102     Medicine paged and updated. PRN hydralazine 25mg and scheduled amlodipine 10mg administered per parameters at 0832. Nursing will continue to monitor.

## 2021-11-12 NOTE — DISCHARGE INSTRUCTIONS
Behavioral Discharge Planning and Instructions    Summary: You were admitted on 11/9/2021  due to substance use and attempt to self harm. You were treated by Sonia Sheets NP and discharged on 11/12/21 from [facility] to Home    Main Diagnosis:   Unspecified psychosis, likely substance-induced  Unspecified depressive disorder, possibly substance-induced  Cannabis abuse  Stimulant (cocaine) abuse  Elevated blood pressure  Wounds on bilateral knees    Health Care Follow-up: You refused to engage in follow-up care appointments and establish insurance while admitted on [facility] [address]. We encourage you to follow up with a primary care provider and psychiatry to treat your diagnosis. Below is a list of resources, information on obtaining insurance, and a list of providers near your home.     You can apply for health insurance in [facility] bu calling health care assistance ( [phone]) or online at  [email or web address]    Below is a list of providers near you that have openings for both primary care and psychiatry     [facility]  [address] [place] [address]  Located in [facility], Level 5  Clinic hours:  8:00 am - 5:00 pm M - F  Appointments: [phone]    [facility]  [address] [place], [address]  Clinic hours:  8:00 am - 5:00 pm M-F  Appointments: [phone]      Major Treatments, Procedures and Findings:  You were provided with: a psychiatric assessment, assessed for medical stability, medication evaluation and/or management and group therapy    Symptoms to Report: feeling more aggressive, increased confusion, losing more sleep, mood getting worse or thoughts of suicide    Early warning signs can include: increased depression or anxiety sleep disturbances increased thoughts or behaviors of suicide or self-harm  increased unusual thinking, such as paranoia.    Safety and Wellness:  Take all  "medicines as directed.  Make no changes unless your doctor suggests them.Follow treatment recommendations. Refrain from alcohol and non-prescribed drugs.  Ask your support system to help you reduce your access to items that could harm yourself or others.     Resources:   Crisis Intervention: 611.499.9578 or 264-899-1570 (TTY: 878.250.9468).  Call anytime for help.  National [place] [employer] Mental Illness (www.[place].mirna.org): 466.519.6570 or 907-938-0755.  Self- Management and Recovery Training., SMART-- Toll free: 851.753.2733  www.[employer]  [facility] Crisis (COPE) Response - Adult 279 988-0084  Text 4 Life: txt \"LIFE\" to 53009 for immediate support and crisis intervention  Crisis Intervention: 517.963.8805 or 280-624-7320. Call anytime for help.     General Medication Instructions:   See your medication sheet(s) for instructions.   Take all medicines as directed.  Make no changes unless your doctor suggests them.   Go to all your doctor visits.  Be sure to have all your required lab tests. This way, your medicines can be refilled on time.  Do not use any drugs not prescribed by your doctor.  Avoid alcohol.    Advance Directives:   Scanned document on file with [place]? No scanned doc  Is document scanned? Pt states no documents  Honoring Choices Your Rights Handout: Informed and given  Was more information offered? Pt declined    The Treatment team has appreciated the opportunity to work with you. If you have any questions or concerns about your recent admission, you can contact the unit which can receive your call 24 hours a day, 7 days a week. They will be able to get in touch with a Provider if needed. The unit number is [phone]  "